# Patient Record
Sex: FEMALE | Race: WHITE | Employment: UNEMPLOYED | URBAN - METROPOLITAN AREA
[De-identification: names, ages, dates, MRNs, and addresses within clinical notes are randomized per-mention and may not be internally consistent; named-entity substitution may affect disease eponyms.]

---

## 2022-07-15 ENCOUNTER — TELEPHONE (OUTPATIENT)
Dept: FAMILY MEDICINE CLINIC | Age: 47
End: 2022-07-15

## 2022-07-15 NOTE — TELEPHONE ENCOUNTER
called on behalf of wife. She cant keep any food down and is so dehydrated. The  is taking her to [facility] for obs. She does have her appt with Dr Marguerite Munguia on the 20th.

## 2022-07-20 ENCOUNTER — OFFICE VISIT (OUTPATIENT)
Dept: FAMILY MEDICINE CLINIC | Age: 47
End: 2022-07-20
Payer: MEDICAID

## 2022-07-20 VITALS
DIASTOLIC BLOOD PRESSURE: 80 MMHG | HEART RATE: 94 BPM | OXYGEN SATURATION: 96 % | BODY MASS INDEX: 16.86 KG/M2 | SYSTOLIC BLOOD PRESSURE: 122 MMHG | HEIGHT: 65 IN | TEMPERATURE: 96.5 F | WEIGHT: 101.2 LBS

## 2022-07-20 DIAGNOSIS — D64.89 ANEMIA DUE TO OTHER CAUSE, NOT CLASSIFIED: ICD-10-CM

## 2022-07-20 DIAGNOSIS — F17.200 SMOKER: ICD-10-CM

## 2022-07-20 DIAGNOSIS — K02.9 PAIN DUE TO DENTAL CARIES: ICD-10-CM

## 2022-07-20 DIAGNOSIS — F17.210 CIGARETTE NICOTINE DEPENDENCE WITHOUT COMPLICATION: ICD-10-CM

## 2022-07-20 DIAGNOSIS — R53.83 FATIGUE, UNSPECIFIED TYPE: ICD-10-CM

## 2022-07-20 DIAGNOSIS — G62.9 NEUROPATHY: ICD-10-CM

## 2022-07-20 DIAGNOSIS — R56.9 SEIZURE (HCC): ICD-10-CM

## 2022-07-20 DIAGNOSIS — Z87.898 HISTORY OF ALCOHOL USE DISORDER: ICD-10-CM

## 2022-07-20 DIAGNOSIS — M62.84 SARCOPENIA: ICD-10-CM

## 2022-07-20 DIAGNOSIS — K86.0 ALCOHOL-INDUCED CHRONIC PANCREATITIS (HCC): Primary | ICD-10-CM

## 2022-07-20 DIAGNOSIS — Z11.59 NEED FOR HEPATITIS C SCREENING TEST: ICD-10-CM

## 2022-07-20 PROBLEM — K85.20 ACUTE ALCOHOLIC PANCREATITIS: Status: ACTIVE | Noted: 2019-02-23

## 2022-07-20 PROBLEM — R73.9 HYPERGLYCEMIA: Status: ACTIVE | Noted: 2022-07-20

## 2022-07-20 PROCEDURE — 99204 OFFICE O/P NEW MOD 45 MIN: CPT | Performed by: FAMILY MEDICINE

## 2022-07-20 PROCEDURE — G8419 CALC BMI OUT NRM PARAM NOF/U: HCPCS | Performed by: FAMILY MEDICINE

## 2022-07-20 PROCEDURE — G8427 DOCREV CUR MEDS BY ELIG CLIN: HCPCS | Performed by: FAMILY MEDICINE

## 2022-07-20 PROCEDURE — 36415 COLL VENOUS BLD VENIPUNCTURE: CPT | Performed by: FAMILY MEDICINE

## 2022-07-20 PROCEDURE — 4004F PT TOBACCO SCREEN RCVD TLK: CPT | Performed by: FAMILY MEDICINE

## 2022-07-20 RX ORDER — PANCRELIPASE 4200; 24600; 14200 [USP'U]/1; [USP'U]/1; [USP'U]/1
1 CAPSULE, DELAYED RELEASE ORAL
Qty: 90 CAPSULE | Refills: 2 | Status: SHIPPED | OUTPATIENT
Start: 2022-07-20 | End: 2022-10-28 | Stop reason: ALTCHOICE

## 2022-07-20 RX ORDER — TRAMADOL HYDROCHLORIDE 50 MG/1
50 TABLET ORAL EVERY 8 HOURS PRN
Qty: 30 TABLET | Refills: 0 | Status: SHIPPED | OUTPATIENT
Start: 2022-07-20 | End: 2022-08-30 | Stop reason: SDUPTHER

## 2022-07-20 RX ORDER — TRAMADOL HYDROCHLORIDE 50 MG/1
50 TABLET ORAL EVERY 6 HOURS PRN
COMMUNITY
End: 2022-07-20 | Stop reason: SDUPTHER

## 2022-07-20 RX ORDER — AMOXICILLIN 500 MG/1
500 CAPSULE ORAL 3 TIMES DAILY
Qty: 30 CAPSULE | Refills: 0 | Status: SHIPPED | OUTPATIENT
Start: 2022-07-20 | End: 2022-07-30

## 2022-07-20 RX ORDER — TRAMADOL HYDROCHLORIDE 50 MG/1
50 TABLET ORAL EVERY 8 HOURS PRN
COMMUNITY
Start: 2022-07-18 | End: 2022-07-20

## 2022-07-20 RX ORDER — PANCRELIPASE 4200; 24600; 14200 [USP'U]/1; [USP'U]/1; [USP'U]/1
1 CAPSULE, DELAYED RELEASE ORAL
Qty: 90 CAPSULE | Refills: 2 | Status: SHIPPED | OUTPATIENT
Start: 2022-07-20 | End: 2022-07-20 | Stop reason: SDUPTHER

## 2022-07-20 ASSESSMENT — PATIENT HEALTH QUESTIONNAIRE - PHQ9
SUM OF ALL RESPONSES TO PHQ9 QUESTIONS 1 & 2: 1
SUM OF ALL RESPONSES TO PHQ QUESTIONS 1-9: 1
2. FEELING DOWN, DEPRESSED OR HOPELESS: 0
SUM OF ALL RESPONSES TO PHQ QUESTIONS 1-9: 1
1. LITTLE INTEREST OR PLEASURE IN DOING THINGS: 1
SUM OF ALL RESPONSES TO PHQ QUESTIONS 1-9: 1
SUM OF ALL RESPONSES TO PHQ QUESTIONS 1-9: 1

## 2022-07-20 ASSESSMENT — ENCOUNTER SYMPTOMS
SHORTNESS OF BREATH: 0
DIARRHEA: 0
NAUSEA: 1
VOMITING: 1
COUGH: 0
BLOOD IN STOOL: 0
CONSTIPATION: 1
ABDOMINAL PAIN: 1
ANAL BLEEDING: 0
BACK PAIN: 1
ABDOMINAL DISTENTION: 1

## 2022-07-20 NOTE — ASSESSMENT & PLAN NOTE
She has sarcopenia and I encouraged her to experiment with different types of protein hoping that she can tolerate 1 better than another. I encouraged her to try legumes and eggs.

## 2022-07-20 NOTE — ASSESSMENT & PLAN NOTE
Or full Neuropathy could be from vitamin B-12 deficiency or could be from heavy alcohol use in the past.  I will order labs to look for possible treatable causes.

## 2022-07-20 NOTE — ASSESSMENT & PLAN NOTE
Encourage smoking cessation but I understand that right now she is dealing with things that are higher priority for her.

## 2022-07-20 NOTE — PROGRESS NOTES
7/20/22    Reyes Sherron  1975    SUBJECTIVE    HPI - David Angeles is a 48 y.o. female who presents today for evaluation of:  Chief Complaint   Patient presents with    New Patient     Acute pancreatitis : 2 yr pancreatitis. Recently in [place] was told she had a cyst. ([address]). She will be seeing a gastroenterologist at [facility]. Was hospitalized with a lot of pancreatitis pain a week ago due to pain and vomiting. Tramadol odes hlep her pain. She starts w/ apap but occ takes tramadol. She thinks 20 pills would last quite some time. Her most recent episode of pancreatitis occurred when she ate a hamburger and also went to a dairy place. She states that she can tolerate eggs. Dental : She also has swelling and pain in her right jaw. Her teeth are in horrible condition. History of seizures: She had some seizures in the past.  She took a seizure medication for a while but then forgot about it and stopped taking the seizure medicine. She has not had seizures recently. Feet : She reports numbness and burning in her feet and ankles up to the lower part of her calf. Review of Systems   Constitutional:  Positive for fatigue. Negative for fever. Respiratory:  Negative for cough and shortness of breath. Cardiovascular:  Negative for chest pain and leg swelling. Gastrointestinal:  Positive for abdominal distention, abdominal pain, constipation, nausea and vomiting. Negative for anal bleeding, blood in stool and diarrhea. Musculoskeletal:  Positive for back pain. Negative for arthralgias, joint swelling and neck pain. Neurological:  Positive for weakness. Negative for tremors, syncope and headaches.        No Known Allergies     OBJECTIVE    /80 (Site: Left Upper Arm, Position: Sitting, Cuff Size: Medium Adult)   Pulse 94   Temp (!) 96.5 °F (35.8 °C) (Infrared)   Ht 5' 5\" (1.651 m)   Wt 101 lb 3.2 oz (45.9 kg)   SpO2 96%   BMI 16.84 kg/m²     Physical Exam Constitutional:       General: Not in acute distress. Appearance: Normal appearance. Not ill-appearing. Eyes:      General: No scleral icterus. ENT: She has some swelling of her right mandible area and redness externally. Her teeth appear to be in very poor condition with multiple cavities. Cardiovascular:      Rate and Rhythm: Normal rate and regular rhythm. Heart sounds: No murmur heard. No friction rub. No gallop. Pulmonary:      Effort: Pulmonary effort is normal. No respiratory distress. Breath sounds: No wheezing, rhonchi or rales. Abdominal:      Palpations: Abdomen is soft. There is no mass. Tenderness: mild epigastric tenderness. Musculoskeletal:     Moves all extremities normally. Quite sarcopenic. Skin:     General: Skin is warm. Coloration: Skin is not jaundiced. Neurological:      Mental Status: Patient is alert. Psychiatric:         Behavior: Behavior normal.         Thought Content: Thought content normal.         Judgment: Judgment normal.    Reviewed:  PDMP shows recent tramadol rx for just 9 pills. ASSESSMENT/PLAN:    1. Pancreatitis, Alcohol-induced chronic pancreatitis [facility])  Assessment & Plan:   Her biggest problem is chronic pancreatitis. I will prescribe Pancreaze that she can take 3 times a day. She will be following up with her gastroenterology doctor with [facility]. I will prescribe a limited number of tramadol to help with her pain. I did check PDMP and It Shows 9 Tramadol tablets recently dispensed. Orders:  -     lipase-protease-amylase (PANCREAZE) 4200-86769 units CPEP delayed release capsule; Take 1 capsule by mouth in the morning and 1 capsule at noon and 1 capsule in the evening. Take with meals. , Disp-90 capsule, R-2Normal  -     traMADol (ULTRAM) 50 MG tablet; Take 1 tablet by mouth every 8 hours as needed for Pain for up to 15 days. , Disp-30 tablet, R-0Normal  -     Lipase  2.  Smoker  Assessment & Plan:   Encourage smoking cessation but I understand that right now she is dealing with things that are higher priority for her. 3. History of alcohol use disorder  Assessment & Plan:   She has stopped drinking alcohol completely and managed to do that successfully. 4. Seizure ([address]  Assessment & Plan:   Stable. 5. Cigarette nicotine dependence without complication  6. Anemia due to other cause, not classified  Assessment & Plan: We will order labs to evaluate for possible types of anemia. Orders:  -     CBC with Auto Differential  -     Iron and TIBC  -     Vitamin B12 & Folate  -     Comprehensive Metabolic Panel  7. Sarcopenia  Assessment & Plan:   She has sarcopenia and I encouraged her to experiment with different types of protein hoping that she can tolerate 1 better than another. I encouraged her to try legumes and eggs. 8. Neuropathy  Assessment & Plan:  Or full Neuropathy could be from vitamin B-12 deficiency or could be from heavy alcohol use in the past.  I will order labs to look for possible treatable causes. Orders:  -     TSH with Reflex to FT4  9. Fatigue, unspecified type  Assessment & Plan:   I will check labs for possible causes of fatigue. Orders:  -     Hepatitis B Surface Antigen  10. Need for hepatitis C screening test  -     Hepatitis C Antibody  11. Pain due to dental caries  Assessment & Plan:   I will prescribe amoxicillin to help with controlling dental infection. She is working on getting to a dentist.  Orders:  -     amoxicillin (AMOXIL) 500 MG capsule; Take 1 capsule by mouth in the morning and 1 capsule at noon and 1 capsule before bedtime. Do all this for 10 days. , Disp-30 capsule, R-0Normal    It is safe to take acetaminophen up to a total dose of 3000 mg spread through the day. Be sure to include acetaminophen in all products since acetaminophen can be in cold preparations and in some opioid pain medications.      Counseling provided for:    Cannabis - Heavy cannabis use will lead to a significant decline in your IQ. Recommended smoking cessation. Return in about 4 weeks (around 8/17/2022) for Juan Amaya.    Winston Amezcua MD

## 2022-07-20 NOTE — ASSESSMENT & PLAN NOTE
I will prescribe amoxicillin to help with controlling dental infection.   She is working on getting to a dentist.

## 2022-07-25 ENCOUNTER — NURSE ONLY (OUTPATIENT)
Dept: FAMILY MEDICINE CLINIC | Age: 47
End: 2022-07-25

## 2022-07-25 DIAGNOSIS — Z11.59 NEED FOR HEPATITIS C SCREENING TEST: Primary | ICD-10-CM

## 2022-07-25 LAB
BASOPHILS ABSOLUTE: 0.1 K/UL (ref 0–0.2)
BASOPHILS RELATIVE PERCENT: 0.8 %
EOSINOPHILS ABSOLUTE: 0.3 K/UL (ref 0–0.6)
EOSINOPHILS RELATIVE PERCENT: 4.6 %
HCT VFR BLD CALC: 39 % (ref 36–48)
HEMOGLOBIN: 12.7 G/DL (ref 12–16)
LYMPHOCYTES ABSOLUTE: 2.2 K/UL (ref 1–5.1)
LYMPHOCYTES RELATIVE PERCENT: 30.2 %
MCH RBC QN AUTO: 32.4 PG (ref 26–34)
MCHC RBC AUTO-ENTMCNC: 32.7 G/DL (ref 31–36)
MCV RBC AUTO: 98.9 FL (ref 80–100)
MONOCYTES ABSOLUTE: 0.3 K/UL (ref 0–1.3)
MONOCYTES RELATIVE PERCENT: 4 %
NEUTROPHILS ABSOLUTE: 4.4 K/UL (ref 1.7–7.7)
NEUTROPHILS RELATIVE PERCENT: 60.4 %
PDW BLD-RTO: 16.4 % (ref 12.4–15.4)
PLATELET # BLD: 462 K/UL (ref 135–450)
PMV BLD AUTO: 8.9 FL (ref 5–10.5)
RBC # BLD: 3.94 M/UL (ref 4–5.2)
WBC # BLD: 7.3 K/UL (ref 4–11)

## 2022-07-26 LAB
A/G RATIO: 1.3 (ref 1.1–2.2)
ALBUMIN SERPL-MCNC: 3.5 G/DL (ref 3.4–5)
ALP BLD-CCNC: 101 U/L (ref 40–129)
ALT SERPL-CCNC: 14 U/L (ref 10–40)
ANION GAP SERPL CALCULATED.3IONS-SCNC: 16 MMOL/L (ref 3–16)
AST SERPL-CCNC: 26 U/L (ref 15–37)
BILIRUB SERPL-MCNC: <0.2 MG/DL (ref 0–1)
BUN BLDV-MCNC: 7 MG/DL (ref 7–20)
CALCIUM SERPL-MCNC: 10.2 MG/DL (ref 8.3–10.6)
CHLORIDE BLD-SCNC: 99 MMOL/L (ref 99–110)
CO2: 23 MMOL/L (ref 21–32)
CREAT SERPL-MCNC: <0.5 MG/DL (ref 0.6–1.1)
FOLATE: >20 NG/ML (ref 4.78–24.2)
GFR AFRICAN AMERICAN: >60
GFR NON-AFRICAN AMERICAN: >60
GLUCOSE BLD-MCNC: 225 MG/DL (ref 70–99)
HEPATITIS B SURFACE ANTIGEN INTERPRETATION: NORMAL
HEPATITIS C ANTIBODY INTERPRETATION: NORMAL
IRON SATURATION: 28 % (ref 15–50)
IRON: 81 UG/DL (ref 37–145)
LIPASE: 127 U/L (ref 13–60)
POTASSIUM SERPL-SCNC: 4.5 MMOL/L (ref 3.5–5.1)
SODIUM BLD-SCNC: 138 MMOL/L (ref 136–145)
TOTAL IRON BINDING CAPACITY: 291 UG/DL (ref 260–445)
TOTAL PROTEIN: 6.1 G/DL (ref 6.4–8.2)
TSH REFLEX FT4: 0.51 UIU/ML (ref 0.27–4.2)
VITAMIN B-12: 402 PG/ML (ref 211–911)

## 2022-07-28 DIAGNOSIS — K86.0 ALCOHOL-INDUCED CHRONIC PANCREATITIS (HCC): Primary | ICD-10-CM

## 2022-08-05 ENCOUNTER — OFFICE (OUTPATIENT)
Dept: URBAN - METROPOLITAN AREA CLINIC 13 | Facility: CLINIC | Age: 47
End: 2022-08-05
Payer: MEDICAID

## 2022-08-05 VITALS
OXYGEN SATURATION: 99 % | HEIGHT: 65 IN | DIASTOLIC BLOOD PRESSURE: 60 MMHG | HEART RATE: 96 BPM | WEIGHT: 97.2 LBS | SYSTOLIC BLOOD PRESSURE: 100 MMHG

## 2022-08-05 DIAGNOSIS — R63.4 ABNORMAL WEIGHT LOSS: ICD-10-CM

## 2022-08-05 DIAGNOSIS — R93.3 ABNORMAL FINDINGS ON DIAGNOSTIC IMAGING OF OTHER PARTS OF DI: ICD-10-CM

## 2022-08-05 DIAGNOSIS — K86.9 DISEASE OF PANCREAS, UNSPECIFIED: ICD-10-CM

## 2022-08-05 PROCEDURE — 99215 OFFICE O/P EST HI 40 MIN: CPT | Performed by: INTERNAL MEDICINE

## 2022-08-26 LAB
CBC, PLATELET CT  AND  DIFF: ABS BASOPHIL: 0.1 K/UL
CBC, PLATELET CT  AND  DIFF: ABS EOSINOPHIL: 0.1 K/UL
CBC, PLATELET CT  AND  DIFF: ABS IMMATURE GRANS: 0.1 K/UL
CBC, PLATELET CT  AND  DIFF: ABS LYMPHOCYTE: 3.3 K/UL
CBC, PLATELET CT  AND  DIFF: ABS MONOCYTE: 1 K/UL
CBC, PLATELET CT  AND  DIFF: ABS NEUTROPHIL: 5.3 K/UL
CBC, PLATELET CT  AND  DIFF: BASOPHIL: 0.6 %
CBC, PLATELET CT  AND  DIFF: DIFFERENTIAL: (no result)
CBC, PLATELET CT  AND  DIFF: EOSINOPHIL: 1.2 %
CBC, PLATELET CT  AND  DIFF: HEMATOCRIT: 43.4 %
CBC, PLATELET CT  AND  DIFF: HEMOGLOBIN: 14.9 G/DL
CBC, PLATELET CT  AND  DIFF: IMMATURE GRANULOCYTES: 0.6 %
CBC, PLATELET CT  AND  DIFF: LYMPHOCYTE: 33.5 %
CBC, PLATELET CT  AND  DIFF: MCH: 32.7 PG
CBC, PLATELET CT  AND  DIFF: MCHC: 34.3 G/DL
CBC, PLATELET CT  AND  DIFF: MCV: 95.4 FL
CBC, PLATELET CT  AND  DIFF: MONOCYTE: 9.8 %
CBC, PLATELET CT  AND  DIFF: MPV: 10.8 FL
CBC, PLATELET CT  AND  DIFF: NEUTROPHIL: 54.3 %
CBC, PLATELET CT  AND  DIFF: NRBCS: 0 /100 WBC
CBC, PLATELET CT  AND  DIFF: PLATELET COUNT: 230 K/UL
CBC, PLATELET CT  AND  DIFF: RBC: 4.55 M/UL
CBC, PLATELET CT  AND  DIFF: RDW: 14.2 %
CBC, PLATELET CT  AND  DIFF: WBC COUNT: 9.7 K/UL
COMPREHENSIVE METABOLIC PANEL: A/G RATIO: 1.3 RATIO
COMPREHENSIVE METABOLIC PANEL: ALBUMIN: 4.3 G/DL
COMPREHENSIVE METABOLIC PANEL: ALK PHOSPHATASE: 129 U/L
COMPREHENSIVE METABOLIC PANEL: ALT: 9 U/L
COMPREHENSIVE METABOLIC PANEL: AST: 22 U/L
COMPREHENSIVE METABOLIC PANEL: BILIRUBIN,TOTAL: 0.4 MG/DL
COMPREHENSIVE METABOLIC PANEL: BLOOD UREA NITROGEN: 7 MG/DL
COMPREHENSIVE METABOLIC PANEL: BUN/CREAT RATIO: 18
COMPREHENSIVE METABOLIC PANEL: CALCIUM: 10.1 MG/DL
COMPREHENSIVE METABOLIC PANEL: CHLORIDE: 99 MEQ/L
COMPREHENSIVE METABOLIC PANEL: CO2: 25 MEQ/L
COMPREHENSIVE METABOLIC PANEL: CREATININE: 0.4 MG/DL — LOW
COMPREHENSIVE METABOLIC PANEL: FASTING STATUS: (no result)
COMPREHENSIVE METABOLIC PANEL: GLOBULIN: 3.2 G/DL
COMPREHENSIVE METABOLIC PANEL: GLOMERULAR FILTRATION RATE (GFR): 122 MLS/MIN/1.73M2
COMPREHENSIVE METABOLIC PANEL: GLUCOSE,RANDOM: 77 MG/DL
COMPREHENSIVE METABOLIC PANEL: POTASSIUM: 3.2 MEQ/L — LOW
COMPREHENSIVE METABOLIC PANEL: SODIUM: 135 MEQ/L
COMPREHENSIVE METABOLIC PANEL: TOTAL PROTEIN: 7.5 G/DL
LIPASE: 278 U/L — HIGH

## 2022-08-30 ENCOUNTER — OFFICE VISIT (OUTPATIENT)
Dept: FAMILY MEDICINE CLINIC | Age: 47
End: 2022-08-30
Payer: MEDICAID

## 2022-08-30 VITALS
DIASTOLIC BLOOD PRESSURE: 80 MMHG | SYSTOLIC BLOOD PRESSURE: 122 MMHG | TEMPERATURE: 97.1 F | BODY MASS INDEX: 15.49 KG/M2 | HEART RATE: 106 BPM | HEIGHT: 65 IN | WEIGHT: 93 LBS | OXYGEN SATURATION: 97 %

## 2022-08-30 DIAGNOSIS — Z78.0 MENOPAUSE: ICD-10-CM

## 2022-08-30 DIAGNOSIS — Z01.419 VISIT FOR GYNECOLOGIC EXAMINATION: ICD-10-CM

## 2022-08-30 DIAGNOSIS — K86.0 ALCOHOL-INDUCED CHRONIC PANCREATITIS (HCC): ICD-10-CM

## 2022-08-30 DIAGNOSIS — Z12.12 SCREENING FOR COLORECTAL CANCER: ICD-10-CM

## 2022-08-30 DIAGNOSIS — Z00.00 ENCOUNTER FOR WELL ADULT EXAM WITHOUT ABNORMAL FINDINGS: Primary | ICD-10-CM

## 2022-08-30 DIAGNOSIS — Z12.11 SCREENING FOR COLORECTAL CANCER: ICD-10-CM

## 2022-08-30 DIAGNOSIS — M62.84 SARCOPENIA: ICD-10-CM

## 2022-08-30 DIAGNOSIS — G62.9 NEUROPATHY: ICD-10-CM

## 2022-08-30 PROCEDURE — 99396 PREV VISIT EST AGE 40-64: CPT | Performed by: FAMILY MEDICINE

## 2022-08-30 PROCEDURE — G8419 CALC BMI OUT NRM PARAM NOF/U: HCPCS | Performed by: FAMILY MEDICINE

## 2022-08-30 PROCEDURE — 4004F PT TOBACCO SCREEN RCVD TLK: CPT | Performed by: FAMILY MEDICINE

## 2022-08-30 PROCEDURE — 99213 OFFICE O/P EST LOW 20 MIN: CPT | Performed by: FAMILY MEDICINE

## 2022-08-30 PROCEDURE — G8427 DOCREV CUR MEDS BY ELIG CLIN: HCPCS | Performed by: FAMILY MEDICINE

## 2022-08-30 RX ORDER — TRAMADOL HYDROCHLORIDE 50 MG/1
50 TABLET ORAL EVERY 8 HOURS PRN
Qty: 30 TABLET | Refills: 0 | Status: SHIPPED | OUTPATIENT
Start: 2022-09-15 | End: 2022-09-30

## 2022-08-30 RX ORDER — HYDROCODONE BITARTRATE AND ACETAMINOPHEN 5; 325 MG/1; MG/1
1 TABLET ORAL EVERY 6 HOURS PRN
COMMUNITY

## 2022-08-30 RX ORDER — GABAPENTIN 300 MG/1
300 CAPSULE ORAL 2 TIMES DAILY
Qty: 60 CAPSULE | Refills: 1 | Status: SHIPPED | OUTPATIENT
Start: 2022-08-30 | End: 2022-10-29

## 2022-08-30 RX ORDER — SODIUM PICOSULFATE, MAGNESIUM OXIDE, AND ANHYDROUS CITRIC ACID 10; 3.5; 12 MG/160ML; G/160ML; G/160ML
LIQUID ORAL
COMMUNITY
Start: 2022-08-08

## 2022-08-30 RX ORDER — IBUPROFEN 600 MG/1
600 TABLET ORAL EVERY 6 HOURS PRN
COMMUNITY

## 2022-08-30 ASSESSMENT — ENCOUNTER SYMPTOMS
NAUSEA: 0
ANAL BLEEDING: 0
COUGH: 0
CONSTIPATION: 0
VOMITING: 0
EYE PAIN: 0
TROUBLE SWALLOWING: 0
SHORTNESS OF BREATH: 0
BLOOD IN STOOL: 0
ABDOMINAL PAIN: 1
APNEA: 0
DIARRHEA: 0

## 2022-08-30 NOTE — PROGRESS NOTES
8/30/22    Johanna Bebe  1975  48 y.o. female     Adult Annual Preventive Visit  Chief Complaint   Patient presents with    Follow-up   E/M : Pancreatitis : she manages minor flares of pancreatitis with liwquid diet. She still has about 15 tramadol left. Tramadol is working pretty well. She has lost some weight. She is working with a GI doctor. She is holding the tramadol now while using Norco. Burning pain in feet and ankles. Activity habits: She is pretty active. She is active when working. Eating habits: Not eating now due to pancratitis. Normally eats reasonably well. Sleep habits: good. Social support: Good    Mentation:   No or minimal trouble with memory and Learning new things    Review of Systems   Constitutional:  Negative for appetite change, fatigue, fever and unexpected weight change. HENT:  Negative for nosebleeds and trouble swallowing. Eyes:  Negative for pain and visual disturbance. Respiratory:  Negative for apnea, cough and shortness of breath. Cardiovascular:  Negative for chest pain and leg swelling. Gastrointestinal:  Positive for abdominal pain. Negative for anal bleeding, blood in stool, constipation, diarrhea, nausea and vomiting. Endocrine: Negative for cold intolerance, heat intolerance and polyuria. Genitourinary:  Negative for difficulty urinating and hematuria. Musculoskeletal:  Positive for arthralgias (tingly in feet and ankles. ). Negative for gait problem. Skin:  Negative for rash and wound. Allergic/Immunologic: Negative for environmental allergies, food allergies and immunocompromised state. Neurological:  Negative for speech difficulty, light-headedness and headaches. Hematological:  Negative for adenopathy. Does not bruise/bleed easily. Psychiatric/Behavioral:  Negative for dysphoric mood. The patient is nervous/anxious. No menses x 5 yrs.      No Known Allergies     Current Outpatient Medications   Medication Sig Dispense Refill    HYDROcodone-acetaminophen (NORCO) 5-325 MG per tablet Take 1 tablet by mouth every 6 hours as needed for Pain. ibuprofen (ADVIL;MOTRIN) 600 MG tablet Take 600 mg by mouth every 6 hours as needed for Pain      [START ON 9/15/2022] traMADol (ULTRAM) 50 MG tablet Take 1 tablet by mouth every 8 hours as needed for Pain for up to 15 days. 30 tablet 0    gabapentin (NEURONTIN) 300 MG capsule Take 1 capsule by mouth 2 times daily for 60 days. Intended supply: 30 days 60 capsule 1    CLENPIQ 10-3.5-12 MG-GM -GM/160ML SOLN solution DRINK 1ST DOSE AT 5PM DAY PRE-PROCEDURE. DRINK 2ND DOSE 6HRS PRE-PROCEDURE. NOTHING BY MOUTH 4HR PRE-PROCEDURE      lipase-protease-amylase (PANCREAZE) 4200-92255 units CPEP delayed release capsule Take 1 capsule by mouth in the morning and 1 capsule at noon and 1 capsule in the evening. Take with meals. (Patient not taking: Reported on 8/30/2022) 90 capsule 2     No current facility-administered medications for this visit. OBJECTIVE    /80 (Site: Right Upper Arm, Position: Sitting, Cuff Size: Medium Adult)   Pulse (!) 106   Temp 97.1 °F (36.2 °C) (Infrared)   Ht 5' 5\" (1.651 m)   Wt 93 lb (42.2 kg)   SpO2 97%   BMI 15.48 kg/m²     Physical Exam   Constitutional:       General: Not in acute distress. Appearance: Normal appearance. Not ill-appearing, toxic-appearing or diaphoretic. HENT:      Head: Normocephalic. Right Ear: Tympanic membrane, ear canal and external ear normal.      Left Ear: Tympanic membrane, ear canal and external ear normal.      Nose: No rhinorrhea. Mouth/Throat:      Mouth: Mucous membranes are moist.      Pharynx: Oropharynx is clear. No oropharyngeal exudate or posterior oropharyngeal erythema. Eyes:      General: No scleral icterus. Right eye: No discharge. Left eye: No discharge. Conjunctiva/sclera: Conjunctivae normal.   Neck:      Thyroid: No thyroid mass, thyromegaly or thyroid tenderness. Cardiovascular:      Rate and Rhythm: Normal rate and regular rhythm. Pulses:           Posterior tibial pulses are 2+ on the right side and 2+ on the left side. NL [place] DP pulses. Heart sounds: Normal heart sounds. No murmur heard. No friction rub. No gallop. Pulmonary:      Effort: Pulmonary effort is normal. No respiratory distress. Breath sounds: Normal breath sounds. No stridor. No wheezing, rhonchi or rales. Abdominal:      General: There is no distension. Palpations: Abdomen is soft. Tenderness: There is mild abdominal tenderness. There is no guarding. Musculoskeletal:         General: No deformity. Cervical back: No rigidity. Right lower leg: No edema. Left lower leg: No edema. Lymphadenopathy:      Cervical: No cervical adenopathy. Right upper body: No supraclavicular or axillary adenopathy. Left upper body: No supraclavicular or axillary adenopathy. Lower Body: No right inguinal adenopathy. No left inguinal adenopathy. Skin:     General: Skin is warm. Coloration: Skin is not jaundiced. Greatly thickened grt toenails bilat. Neurological:      Mental Status: She is alert. Deep Tendon Reflexes:      Reflex Scores:       Patellar reflexes are 2+ on the right side and 2+ on the left side. Psychiatric:         Attention and Perception: Attention and perception normal.         Mood and Affect: Mood normal.         Speech: Speech normal.         Behavior: Behavior normal.         Thought Content: Thought content normal.    PDMP shows last tramadol #30 from me on 7/21/22 and #20 hydrocodone/apap from dentist 8/29/2022. ASSESSMENT AND PLAN    1. Encounter for well adult exam without abnormal findings  -     Negin Dumont MD, Asiya Grajeda  2. Visit for gynecologic examination  -     Negin Dumont MD, OB-GYNAsiya  3. Screening for colorectal cancer  4.  Menopause  Assessment & Plan:   She is definitely at increased risk of osteoporosis being thin female who smokes. Recommended smoking cessation. Discussed getting enough calcium in foods and getting some sunlight for vitamin D and considering supplemental magnesium. Most importantly I recommended weightbearing exercise and smoking cessation. Orders:  -     DEXA BONE DENSITY AXIAL SKELETON; Future  5. Sarcopenia  Assessment & Plan:   Encouraged strength exercises. 6. Pancreatitis, Alcohol-induced chronic pancreatitis [facility])  Assessment & Plan:   She is able to manage minor flares of pancreatitis. Tramadol is helpful for the pain and I will put out a prescription for tramadol that she can get filled in about mid September although she may not even need it then. Orders:  -     traMADol (ULTRAM) 50 MG tablet; Take 1 tablet by mouth every 8 hours as needed for Pain for up to 15 days. , Disp-30 tablet, R-0Normal  7. Neuropathy  Assessment & Plan:   I will do a trial of gabapentin for nerve pain management. Her neuropathy may be from history of alcohol use. Orders:  -     gabapentin (NEURONTIN) 300 MG capsule; Take 1 capsule by mouth 2 times daily for 60 days. Intended supply: 30 days, Disp-60 capsule, R-1Normal        Counseling provided for:  Healthy eating - Avoid sugar and other refined carbohydrates. Protein can help to get muscle. Try different kinds of protein and find those that do not bother the pancreas. Do weight bearing exercise. Social support - Keep socially involved. This will help with keeping your brain working well (avoiding dementia) as you get older and also help you to be happier. , Sleep hygeine - Get enough rest. Things that help are making sure the room is dark and cool, avoiding screen use for 1-2 hours before bedtime, having an unwinding routine.  , and Mentally activity - Keep trying to learn new things, reading, following sports/fashion/whatever you like, and other mental things to keep your brain working well and avoid dementia. Recommended smoking cessation. Remember to be thankful for the good things in life. Return in about 7 weeks (around 10/20/2022) for pancratitis.      Spenser Parsons MD

## 2022-08-30 NOTE — PATIENT INSTRUCTIONS
Advance Directives: Care Instructions  Overview  An advance directive is a legal way to state your wishes at the end of your life. It tells your family and your doctor what to do if you can't say what youwant. There are two main types of advance directives. You can change them any timeyour wishes change. Living will. This form tells your family and your doctor your wishes about life support and other treatment. The form is also called a declaration. Medical power of . This form lets you name a person to make treatment decisions for you when you can't speak for yourself. This person is called a health care agent (health care proxy, health care surrogate). The form is also called a durable power of  for health care. If you do not have an advance directive, decisions about your medical care maybe made by a family member, or by a doctor or a  who doesn't know you. It may help to think of an advance directive as a gift to the people who carefor you. If you have one, they won't have to make tough decisions by themselves. Follow-up care is a key part of your treatment and safety. Be sure to make and go to all appointments, and call your doctor if you are having problems. It's also a good idea to know your test results and keep alist of the medicines you take. What should you include in an advance directive? Many states have a unique advance directive form. (It may ask you to address specific issues.) Or you might use a universal form that's approved by manystates. If your form doesn't tell you what to address, it may be hard to know what to include in your advance directive. Use the questions below to help you getstarted. Who do you want to make decisions about your medical care if you are not able to? What life-support measures do you want if you have a serious illness that gets worse over time or can't be cured? What are you most afraid of that might happen?  (Maybe you're afraid of having pain, losing your independence, or being kept alive by machines.)  Where would you prefer to die? (Your home? A hospital? A nursing home?)  Do you want to donate your organs when you die? Do you want certain Tenriism practices performed before you die? When should you call for help? Be sure to contact your doctor if you have any questions. Where can you learn more? Go to https://[email or web address].Bosse [email or web address] org and sign in to your BATS account. Enter R264 in the Joldit.com box to learn more about \"Advance Directives: Care Instructions. \"     If you do not have an account, please click on the \"Sign Up Now\" link. Current as of: October 18, 2021               Content Version: 13.3  © 2006-2022 Healthwise, [employer]. Care instructions adapted under license by [facility] ([facility]). If you have questions about a medical condition or this instruction, always ask your healthcare professional. [place] [address] any warranty or liability for your use of this information. Quitting Tobacco: Care Instructions  Overview     People who use tobacco crave the nicotine in tobacco. Giving it up is much harder than simply changing a habit. Your body has to stop craving the nicotine. It is hard to quit, but you can do it. There are many tools thatpeople use to quit. You may find that combining tools works best for you. There are several steps to quitting. Your doctor will help you set up the plan that best meets your needs. You may want to attend a tobacco-cessation program to help you quit. When you choose a program, look for one that has proven success. Ask your doctor for ideas. You will greatly increase your chances of success if you take medicine as well as get counseling or join a cessationprogram.  Some of the changes you feel when you first quit tobacco are uncomfortable. Your body will miss the nicotine at first, and you may feel short-tempered and grumpy.  You may have trouble sleeping or concentrating. Medicine can help you deal with these symptoms. You may struggle with changing your habits. And theurge to use tobacco may continue for a time. This may be a lot to deal with, but keep at it. You will feel better. Follow-up care is a key part of your treatment and safety. Be sure to make and go to all appointments, and call your doctor if you are having problems. It's also a good idea to know your test results and keep alist of the medicines you take. How can you care for yourself at home? Get support. You have a better chance of quitting if you have help and support. Ask your family, friends, and coworkers for support. Join a support group, such as Nicotine Anonymous, for people who are trying to quit using tobacco.  Consider signing up for a tobacco cessation program, such as the American Lung Association's Freedom from Smoking program.  Get text messaging support. Go to the website at www.smokefree. gov to sign up for the [facility] program.  After you quit, do not use tobacco again, not even once. Get rid of all tobacco products and anything that reminds you of tobacco, like ashtrays, spit cups, and lighters. If you smoke, clean your house and your clothes to get rid of the smell. Learn how to live without tobacco. Think about ways you can avoid those things that make you reach for tobacco.  Avoid situations that put you at greatest risk. For some people, it's hard to have a drink with friends or a coffee break with coworkers without using tobacco.  Change your daily routine. Take a different route to work, or eat a meal in a different place. Try to cut down on stress. Calm yourself or release tension by doing an activity you enjoy, such as reading a book, taking a hot bath, or gardening. Learn about treatments that can help you quit. Talk to your doctor or pharmacist about nicotine replacement therapy.  Nicotine replacement products help you slowly reduce the amount of nicotine you need. They can help you deal with cravings and withdrawal symptoms. These products include nicotine patches, gum, lozenges, nasal spray, and inhalers. Most are available without a prescription. Ask your doctor about varenicline (Chantix) or bupropion SR. These prescription medicines can help reduce withdrawal symptoms. They don't contain nicotine. Eat a healthy diet, and get regular exercise. Having healthy habits will help your body move past its craving for nicotine. Be prepared to keep trying. Most people aren't successful the first few times they try to quit. Don't give up if you use tobacco again. Make a list of things you learned, and think about when you want to try again. Where can you learn more? Go to https://Taggs.[employer]it [employer]. org and sign in to your [employer] account. Enter E273 in the Doutor [employer]enda box to learn more about \"Quitting Tobacco: Care Instructions. \"     If you do not have an account, please click on the \"Sign Up Now\" link. Current as of: November 8, 2021               Content Version: 13.3  © 0736-1666 [employer]. Care instructions adapted under license by [employer]. If you have questions about a medical condition or this instruction, always ask your healthcare professional. [place] [address] any warranty or liability for your use of this information. Well Visit, Ages 25 to 48: Care Instructions  Overview     Well visits can help you stay healthy. Your doctor has checked your overall health and may have suggested ways to take good care of yourself. Your doctor also may have recommended tests. At home, you can help prevent illness withhealthy eating, regular exercise, and other steps. Follow-up care is a key part of your treatment and safety. Be sure to make and go to all appointments, and call your doctor if you are having problems.  It's also a good idea to know your test results and keep alist of the medicines you take. How can you care for yourself at home? Get screening tests that you and your doctor decide on. Screening helps find diseases before any symptoms appear. Eat healthy foods. Choose fruits, vegetables, whole grains, protein, and low-fat dairy foods. Limit fat, especially saturated fat. Reduce salt in your diet. Limit alcohol. If you are a man, have no more than 2 drinks a day or 14 drinks a week. If you are a woman, have no more than 1 drink a day or 7 drinks a week. Get at least 30 minutes of physical activity on most days of the week. Walking is a good choice. You also may want to do other activities, such as running, swimming, cycling, or playing tennis or team sports. Discuss any changes in your exercise program with your doctor. Reach and stay at a healthy weight. This will lower your risk for many problems, such as obesity, diabetes, heart disease, and high blood pressure. Do not smoke or allow others to smoke around you. If you need help quitting, talk to your doctor about stop-smoking programs and medicines. These can increase your chances of quitting for good. Care for your mental health. It is easy to get weighed down by worry and stress. Learn strategies to manage stress, like deep breathing and mindfulness, and stay connected with your family and community. If you find you often feel sad or hopeless, talk with your doctor. Treatment can help. Talk to your doctor about whether you have any risk factors for sexually transmitted infections (STIs). You can help prevent STIs if you wait to have sex with a new partner (or partners) until you've each been tested for STIs. It also helps if you use condoms (male or female condoms) and if you limit your sex partners to one person who only has sex with you. Vaccines are available for some STIs, such as HPV. Use birth control if it's important to you to prevent pregnancy.  Talk with your doctor about the choices available and what might be

## 2022-08-30 NOTE — ASSESSMENT & PLAN NOTE
She is able to manage minor flares of pancreatitis. Tramadol is helpful for the pain and I will put out a prescription for tramadol that she can get filled in about mid September although she may not even need it then.

## 2022-08-30 NOTE — ASSESSMENT & PLAN NOTE
I will do a trial of gabapentin for nerve pain management. Her neuropathy may be from history of alcohol use.

## 2022-08-30 NOTE — ASSESSMENT & PLAN NOTE
She is definitely at increased risk of osteoporosis being thin female who smokes. Recommended smoking cessation. Discussed getting enough calcium in foods and getting some sunlight for vitamin D and considering supplemental magnesium. Most importantly I recommended weightbearing exercise and smoking cessation.

## 2022-09-09 ENCOUNTER — OFFICE (OUTPATIENT)
Dept: URBAN - METROPOLITAN AREA PATHOLOGY 1 | Facility: PATHOLOGY | Age: 47
End: 2022-09-09
Payer: MEDICAID

## 2022-09-09 ENCOUNTER — AMBULATORY SURGICAL CENTER (OUTPATIENT)
Dept: URBAN - METROPOLITAN AREA SURGERY 4 | Facility: SURGERY | Age: 47
End: 2022-09-09
Payer: MEDICAID

## 2022-09-09 VITALS
SYSTOLIC BLOOD PRESSURE: 127 MMHG | DIASTOLIC BLOOD PRESSURE: 75 MMHG | SYSTOLIC BLOOD PRESSURE: 125 MMHG | HEART RATE: 71 BPM | DIASTOLIC BLOOD PRESSURE: 92 MMHG | SYSTOLIC BLOOD PRESSURE: 148 MMHG | RESPIRATION RATE: 16 BRPM | RESPIRATION RATE: 15 BRPM | SYSTOLIC BLOOD PRESSURE: 110 MMHG | RESPIRATION RATE: 15 BRPM | SYSTOLIC BLOOD PRESSURE: 118 MMHG | DIASTOLIC BLOOD PRESSURE: 90 MMHG | HEART RATE: 72 BPM | DIASTOLIC BLOOD PRESSURE: 90 MMHG | TEMPERATURE: 97.6 F | SYSTOLIC BLOOD PRESSURE: 132 MMHG | DIASTOLIC BLOOD PRESSURE: 81 MMHG | SYSTOLIC BLOOD PRESSURE: 125 MMHG | RESPIRATION RATE: 17 BRPM | HEART RATE: 70 BPM | WEIGHT: 93 LBS | DIASTOLIC BLOOD PRESSURE: 72 MMHG | OXYGEN SATURATION: 100 % | HEART RATE: 65 BPM | DIASTOLIC BLOOD PRESSURE: 75 MMHG | DIASTOLIC BLOOD PRESSURE: 92 MMHG | RESPIRATION RATE: 18 BRPM | DIASTOLIC BLOOD PRESSURE: 86 MMHG | HEART RATE: 72 BPM | TEMPERATURE: 97.6 F | RESPIRATION RATE: 12 BRPM | RESPIRATION RATE: 23 BRPM | HEIGHT: 65 IN | HEART RATE: 66 BPM | OXYGEN SATURATION: 99 % | SYSTOLIC BLOOD PRESSURE: 127 MMHG | HEART RATE: 65 BPM | DIASTOLIC BLOOD PRESSURE: 80 MMHG | SYSTOLIC BLOOD PRESSURE: 118 MMHG | SYSTOLIC BLOOD PRESSURE: 133 MMHG | DIASTOLIC BLOOD PRESSURE: 80 MMHG | DIASTOLIC BLOOD PRESSURE: 81 MMHG | RESPIRATION RATE: 17 BRPM | OXYGEN SATURATION: 100 % | DIASTOLIC BLOOD PRESSURE: 94 MMHG | HEART RATE: 69 BPM | OXYGEN SATURATION: 97 % | RESPIRATION RATE: 16 BRPM | HEART RATE: 71 BPM | SYSTOLIC BLOOD PRESSURE: 137 MMHG | HEIGHT: 65 IN | RESPIRATION RATE: 12 BRPM | DIASTOLIC BLOOD PRESSURE: 72 MMHG | OXYGEN SATURATION: 97 % | HEART RATE: 66 BPM | HEART RATE: 70 BPM | SYSTOLIC BLOOD PRESSURE: 129 MMHG | WEIGHT: 93 LBS | SYSTOLIC BLOOD PRESSURE: 129 MMHG | SYSTOLIC BLOOD PRESSURE: 132 MMHG | HEART RATE: 69 BPM | RESPIRATION RATE: 23 BRPM | HEART RATE: 81 BPM | DIASTOLIC BLOOD PRESSURE: 94 MMHG | SYSTOLIC BLOOD PRESSURE: 137 MMHG | SYSTOLIC BLOOD PRESSURE: 148 MMHG | DIASTOLIC BLOOD PRESSURE: 86 MMHG | SYSTOLIC BLOOD PRESSURE: 110 MMHG | SYSTOLIC BLOOD PRESSURE: 133 MMHG | OXYGEN SATURATION: 99 % | HEART RATE: 81 BPM | RESPIRATION RATE: 18 BRPM

## 2022-09-09 DIAGNOSIS — D12.5 BENIGN NEOPLASM OF SIGMOID COLON: ICD-10-CM

## 2022-09-09 DIAGNOSIS — R93.3 ABNORMAL FINDINGS ON DIAGNOSTIC IMAGING OF OTHER PARTS OF DI: ICD-10-CM

## 2022-09-09 DIAGNOSIS — R63.4 ABNORMAL WEIGHT LOSS: ICD-10-CM

## 2022-09-09 PROCEDURE — 88305 TISSUE EXAM BY PATHOLOGIST: CPT | Performed by: PATHOLOGY

## 2022-09-09 PROCEDURE — 43235 EGD DIAGNOSTIC BRUSH WASH: CPT | Performed by: INTERNAL MEDICINE

## 2022-09-09 PROCEDURE — 45385 COLONOSCOPY W/LESION REMOVAL: CPT | Performed by: INTERNAL MEDICINE

## 2022-09-16 LAB
PDF: PDF REPORT: (no result)
PDF: PDF REPORT: (no result)

## 2022-09-17 ENCOUNTER — INPATIENT HOSPITAL (OUTPATIENT)
Dept: URBAN - METROPOLITAN AREA HOSPITAL 56 | Facility: HOSPITAL | Age: 47
End: 2022-09-17
Payer: MEDICAID

## 2022-09-17 DIAGNOSIS — F10.11 ALCOHOL ABUSE, IN REMISSION: ICD-10-CM

## 2022-09-17 DIAGNOSIS — K86.0 ALCOHOL-INDUCED CHRONIC PANCREATITIS: ICD-10-CM

## 2022-09-17 DIAGNOSIS — E44.0 MODERATE PROTEIN-CALORIE MALNUTRITION: ICD-10-CM

## 2022-09-17 PROCEDURE — 99254 IP/OBS CNSLTJ NEW/EST MOD 60: CPT | Performed by: NURSE PRACTITIONER

## 2022-09-18 ENCOUNTER — INPATIENT HOSPITAL (OUTPATIENT)
Dept: URBAN - METROPOLITAN AREA HOSPITAL 56 | Facility: HOSPITAL | Age: 47
End: 2022-09-18
Payer: MEDICAID

## 2022-09-18 DIAGNOSIS — F10.11 ALCOHOL ABUSE, IN REMISSION: ICD-10-CM

## 2022-09-18 DIAGNOSIS — K86.0 ALCOHOL-INDUCED CHRONIC PANCREATITIS: ICD-10-CM

## 2022-09-18 DIAGNOSIS — E44.0 MODERATE PROTEIN-CALORIE MALNUTRITION: ICD-10-CM

## 2022-09-18 PROCEDURE — 99232 SBSQ HOSP IP/OBS MODERATE 35: CPT | Performed by: NURSE PRACTITIONER

## 2022-09-21 ENCOUNTER — TELEPHONE (OUTPATIENT)
Dept: FAMILY MEDICINE CLINIC | Age: 47
End: 2022-09-21

## 2022-09-21 DIAGNOSIS — K86.0 ALCOHOL-INDUCED CHRONIC PANCREATITIS (HCC): Primary | ICD-10-CM

## 2022-09-21 RX ORDER — PROMETHAZINE HYDROCHLORIDE 25 MG/1
25 SUPPOSITORY RECTAL EVERY 6 HOURS PRN
Qty: 6 SUPPOSITORY | Refills: 1 | Status: SHIPPED | OUTPATIENT
Start: 2022-09-21 | End: 2022-09-27 | Stop reason: SDUPTHER

## 2022-09-21 NOTE — TELEPHONE ENCOUNTER
Went to hospital w/ pancreatitis and got better w/ Dilaudid. Got better and went home Monday. 5pm Monday vomited. Tuesday got better during the day. It was thought to possibly be Dilaudid withdrawal.   In hospital she was on liquid diet Sat, Sun morning and ate normal food Sun evening and Mon morning. She was on Dilaudid at that time. Now she is clammy. Has trouble keeping down water. no fever. No blood in vomit. Pain is 8/10. Yest had liquid diet. Yesterday evening she had small amount solid food. Hx of seizures but has an anti-seizure rx. I sent Phenergan suppository to [facility] and explained use. I rec'd go to ER if hematemesis or fever or worse.  It is reasonable to try to get through it at home first.

## 2022-09-26 ENCOUNTER — TELEPHONE (OUTPATIENT)
Dept: FAMILY MEDICINE CLINIC | Age: 47
End: 2022-09-26

## 2022-09-26 NOTE — TELEPHONE ENCOUNTER
Pt called stating that she still has pancreatitis after  prescribed some medication to help. Pt states that the medication has not really been helping. Pt states that she is throwing up green stuff. Pt wants to know if there is anything else she can do.

## 2022-09-26 NOTE — TELEPHONE ENCOUNTER
In the evening of 9/24/2022 she called me stating that she was still having trouble keeping food and water down. She did sounds better than she had 2 to 3 days prior. She and I did not think she needed to go to the ER. I did call a prescription for ondansetron to her pharmacy.

## 2022-09-27 ENCOUNTER — OFFICE VISIT (OUTPATIENT)
Dept: FAMILY MEDICINE CLINIC | Age: 47
End: 2022-09-27
Payer: MEDICAID

## 2022-09-27 VITALS
BODY MASS INDEX: 15.76 KG/M2 | SYSTOLIC BLOOD PRESSURE: 112 MMHG | WEIGHT: 94.6 LBS | HEIGHT: 65 IN | TEMPERATURE: 96.5 F | OXYGEN SATURATION: 97 % | HEART RATE: 71 BPM | DIASTOLIC BLOOD PRESSURE: 70 MMHG

## 2022-09-27 DIAGNOSIS — F17.200 SMOKER: ICD-10-CM

## 2022-09-27 DIAGNOSIS — M62.84 SARCOPENIA: ICD-10-CM

## 2022-09-27 DIAGNOSIS — K85.90 ACUTE RECURRENT PANCREATITIS: ICD-10-CM

## 2022-09-27 DIAGNOSIS — R56.9 SEIZURE (HCC): ICD-10-CM

## 2022-09-27 DIAGNOSIS — K86.0 ALCOHOL-INDUCED CHRONIC PANCREATITIS (HCC): Primary | ICD-10-CM

## 2022-09-27 PROCEDURE — 99215 OFFICE O/P EST HI 40 MIN: CPT | Performed by: FAMILY MEDICINE

## 2022-09-27 PROCEDURE — G8427 DOCREV CUR MEDS BY ELIG CLIN: HCPCS | Performed by: FAMILY MEDICINE

## 2022-09-27 PROCEDURE — 4004F PT TOBACCO SCREEN RCVD TLK: CPT | Performed by: FAMILY MEDICINE

## 2022-09-27 PROCEDURE — G8419 CALC BMI OUT NRM PARAM NOF/U: HCPCS | Performed by: FAMILY MEDICINE

## 2022-09-27 RX ORDER — HYDROCODONE BITARTRATE AND ACETAMINOPHEN 5; 325 MG/1; MG/1
1 TABLET ORAL EVERY 6 HOURS PRN
Qty: 28 TABLET | Refills: 0 | Status: SHIPPED | OUTPATIENT
Start: 2022-09-27 | End: 2022-10-04

## 2022-09-27 RX ORDER — PROMETHAZINE HYDROCHLORIDE 25 MG/1
25 SUPPOSITORY RECTAL EVERY 6 HOURS PRN
Qty: 6 SUPPOSITORY | Refills: 1 | Status: SHIPPED | OUTPATIENT
Start: 2022-09-27 | End: 2022-10-04

## 2022-09-27 RX ORDER — PANTOPRAZOLE SODIUM 40 MG/1
40 TABLET, DELAYED RELEASE ORAL DAILY
Qty: 30 TABLET | Refills: 0 | Status: SHIPPED | OUTPATIENT
Start: 2022-09-27 | End: 2022-10-28 | Stop reason: SDUPTHER

## 2022-09-27 RX ORDER — LEVETIRACETAM 250 MG/1
TABLET ORAL
COMMUNITY
Start: 2022-09-19

## 2022-09-27 RX ORDER — ONDANSETRON 8 MG/1
8 TABLET, ORALLY DISINTEGRATING ORAL 3 TIMES DAILY PRN
Qty: 30 TABLET | Refills: 1 | Status: SHIPPED | OUTPATIENT
Start: 2022-09-27 | End: 2022-10-28 | Stop reason: SDUPTHER

## 2022-09-27 RX ORDER — PROMETHAZINE HYDROCHLORIDE 25 MG/1
25 TABLET ORAL 4 TIMES DAILY PRN
Qty: 20 TABLET | Refills: 1 | Status: SHIPPED | OUTPATIENT
Start: 2022-09-27 | End: 2022-10-04

## 2022-09-27 RX ORDER — ONDANSETRON 8 MG/1
1 TABLET, ORALLY DISINTEGRATING ORAL 3 TIMES DAILY PRN
COMMUNITY
Start: 2022-09-24 | End: 2022-09-27 | Stop reason: SDUPTHER

## 2022-09-27 ASSESSMENT — ENCOUNTER SYMPTOMS
SORE THROAT: 0
WHEEZING: 0
DIARRHEA: 0
COUGH: 0
ANAL BLEEDING: 0
CONSTIPATION: 1
CHOKING: 0
SHORTNESS OF BREATH: 1
VOMITING: 1
ABDOMINAL PAIN: 1
BLOOD IN STOOL: 0
NAUSEA: 1
TROUBLE SWALLOWING: 1
ABDOMINAL DISTENTION: 0

## 2022-09-27 NOTE — ASSESSMENT & PLAN NOTE
She has been having recurrent pancreatitis. In the past she drank alcohol heavily but that was over a year ago. It is unclear whether the current bouts of pancreatitis are a residual effect of the former drinking or a problem related to the pancreaticobiliary ductal system. I told her that her assignment is to make her next appointment with her gastroenterologist so that she can review the MRCP from July 2022 and decide what next steps should be done. In the meantime I will prescribe hydrocodone for pain relief when the tramadol does not help adequately. I will put out a new prescription for Phenergan suppositories that can be used when she is vomiting too much to take a pill. I put out a refill prescription for Zofran to help with vomiting. Also I put out a prescription for Phenergan tablets that she can use when she feels that she can take a tablet. Encouraged that she eats small amounts of foods that contain protein such as fish or peanut butter.

## 2022-09-27 NOTE — ASSESSMENT & PLAN NOTE
Encouraged smoking cessation not only because smoking is harmful to the health in general but also because smoking reduces the appetite. She needs appetite in order to help with consumption of enough protein to treat her sarcopenia.

## 2022-09-27 NOTE — PROGRESS NOTES
9/27/22    Michelle Amen  1975    TIP    HPI - Henry Appiah is a 48 y.o. female who presents today for evaluation of:  Chief Complaint   Patient presents with    Other     Pancreatitis w/green emesis      Follow-up     Received fluids and pain medications told her to still come to this appointment       Was in hospital from Friday to Monday with pancreatitis. When went home she attributed the bad feeling to Dilaudid. Last Wed I called in phenergan supps. They helped a little. Saturday I called in Zofran and she felt better Sunday. Monday woke up horrible again. Monday (9/27/22) she went to [facility]. She formerly drank heavily and in the past yr only drank one glass champaign. Her gastroenterologist is Dr Sherlyn Manjarrez w/ [facility], [facility]. She has not yet set up a f/u. She has their phone #. Had a protein shake last night that she kept down. Does not tolerate milk. Recently got some veggies like mashed potatoes and some soup doup. Eating causes pain the next day. She tolerates fish. The tramadol does not always help her pain. She uses it sparingly and still has about 10 tramadol pills left. In 2018 she had a seizure attributed to alcohol withdrawal but  is confused because she had been drinking the night before. Review of Systems   Constitutional:  Positive for chills. Negative for fever (99 to 100). HENT:  Positive for trouble swallowing. Negative for sore throat. Respiratory:  Positive for shortness of breath. Negative for cough, choking and wheezing. Cardiovascular:  Negative for chest pain. Gastrointestinal:  Positive for abdominal pain, constipation, nausea and vomiting. Negative for abdominal distention, anal bleeding, blood in stool and diarrhea. Genitourinary:  Negative for dysuria, hematuria, vaginal bleeding, vaginal discharge and vaginal pain. Psychiatric/Behavioral:  Negative for dysphoric mood.         No Known Allergies     OBJECTIVE    /70 (Site: Left Upper Arm, Position: Sitting, Cuff Size: Medium Adult)   Pulse 71   Temp (!) 96.5 °F (35.8 °C) (Infrared)   Ht 5' 5\" (1.651 m)   Wt 94 lb 9.6 oz (42.9 kg)   SpO2 97%   BMI 15.74 kg/m²     Physical Exam   Constitutional:       General: Not in acute distress. Appearance: Normal appearance. Not ill-appearing. Eyes:      General: No scleral icterus. Cardiovascular:      Rate and Rhythm: Normal rate and regular rhythm. Heart sounds: No murmur heard. No friction rub. No gallop. Pulmonary:      Effort: Pulmonary effort is normal. No respiratory distress. Breath sounds: No wheezing, rhonchi or rales. Abdominal:      Palpations: Abdomen is soft. There is no mass. Tenderness: There is no abdominal tenderness. Musculoskeletal:     Moves all extremities normally. Skin:     General: Skin is warm. Coloration: Skin is not jaundiced. Neurological:      Mental Status: Patient is alert. Psychiatric:         Behavior: Behavior normal.         Thought Content: Thought content normal.         Judgment: Judgment normal.    Reviewed:  9/17/22 MRI of head shows slight progression of white matter disease. No active demyelinization. 7/16/22 MR-MRCP shows some findings suggesting f/u imaging     Hepatic panel shows normal alb and protein (9/26/22)  CBC (9/26/22) shows slightly high WBC.  9/26/22 BMP shows low Na+ of 132. PDMP shows #30 tramadol dispensed 8/30/22 & hydrocodone 5/325 #20 on 8/29/22. ASSESSMENT/PLAN:    1. Pancreatitis, Alcohol-induced chronic pancreatitis ([facility] [address]  2. Acute recurrent pancreatitis  Assessment & Plan:   She has been having recurrent pancreatitis. In the past she drank alcohol heavily but that was over a year ago. It is unclear whether the current bouts of pancreatitis are a residual effect of the former drinking or a problem related to the pancreaticobiliary ductal system.   I told her that her assignment is to make her next appointment with her from some other cause. I will refer her to a neurologist.  There is some evidence of white matter disease on her recent CT and MRI. Orders:  -     External Referral To Neurology  5. Smoker  Assessment & Plan:   Encouraged smoking cessation not only because smoking is harmful to the health in general but also because smoking reduces the appetite. She needs appetite in order to help with consumption of enough protein to treat her sarcopenia. Try to reduce smoking to improve appetite. 48 minutes were spent this calendar day in pre-charting and chart review; obtaining history, performing exam, medical decision making, and counseling patient/caregiver/family, completing orders and documentation, communicating with other health professional*, independently interpreting results and communicating results to patient/caregiver/family*, and care coordination*. *Items marked with asterisk not reported or billed separately. (Time for ECG interpretation or other separately billed interpretation not included in my total minutes.)    Return in about 3 weeks (around 10/18/2022) for pancreatitis.    Kiana Hernandez MD

## 2022-09-27 NOTE — ASSESSMENT & PLAN NOTE
Encouraged consumption of protein. Protein shakes are acceptable but I encouraged other sources of protein such as fish and peanuts as well. I also suggested legume beans. I recommended that they not be in the form of chili but be in a form of plain beans.

## 2022-09-27 NOTE — ASSESSMENT & PLAN NOTE
She has a history of seizures. It is unclear whether her seizures are from alcohol withdrawal or from some other cause. I will refer her to a neurologist.  There is some evidence of white matter disease on her recent CT and MRI.

## 2022-10-03 ENCOUNTER — TELEPHONE (OUTPATIENT)
Dept: FAMILY MEDICINE CLINIC | Age: 47
End: 2022-10-03

## 2022-10-03 NOTE — TELEPHONE ENCOUNTER
She called 10/1/22 with report that after a few d of being better she had N/V again. She is concerned that the pancreas will have permanent damage if not treated and asked I I could admit her to [facility] to bypass ER. I explained that I cannot direct admit to hospital and that she should return to ER and report the story of the past week or so and inquire if the failure of outpatient treatment would support admission. I am not sure whether or not persistent problems would lead to permanent pancrweas damamge but I do not think they would quickly.
Date:12/21/2018

## 2022-10-06 ENCOUNTER — OFFICE (OUTPATIENT)
Dept: URBAN - METROPOLITAN AREA CLINIC 13 | Facility: CLINIC | Age: 47
End: 2022-10-06
Payer: MEDICAID

## 2022-10-06 VITALS
HEART RATE: 81 BPM | DIASTOLIC BLOOD PRESSURE: 64 MMHG | WEIGHT: 94 LBS | SYSTOLIC BLOOD PRESSURE: 118 MMHG | HEIGHT: 65 IN

## 2022-10-06 DIAGNOSIS — K86.9 DISEASE OF PANCREAS, UNSPECIFIED: ICD-10-CM

## 2022-10-06 PROCEDURE — 99213 OFFICE O/P EST LOW 20 MIN: CPT | Mod: SA | Performed by: NURSE PRACTITIONER

## 2022-10-13 ENCOUNTER — TELEPHONE (OUTPATIENT)
Dept: FAMILY MEDICINE CLINIC | Age: 47
End: 2022-10-13

## 2022-10-13 NOTE — TELEPHONE ENCOUNTER
Pt requesting a refill for Tramadol and Pt is also having difficulties with a bowel movement and wondering if there is something that could be prescribed.  Pt has her next appt on the 18th of October

## 2022-10-27 ENCOUNTER — AMBULATORY SURGICAL CENTER (OUTPATIENT)
Dept: URBAN - METROPOLITAN AREA SURGERY 7 | Facility: SURGERY | Age: 47
End: 2022-10-27
Payer: MEDICAID

## 2022-10-27 VITALS
HEART RATE: 78 BPM | SYSTOLIC BLOOD PRESSURE: 109 MMHG | SYSTOLIC BLOOD PRESSURE: 91 MMHG | SYSTOLIC BLOOD PRESSURE: 91 MMHG | HEART RATE: 91 BPM | DIASTOLIC BLOOD PRESSURE: 74 MMHG | DIASTOLIC BLOOD PRESSURE: 71 MMHG | TEMPERATURE: 97.3 F | HEART RATE: 85 BPM | OXYGEN SATURATION: 98 % | DIASTOLIC BLOOD PRESSURE: 58 MMHG | HEIGHT: 66 IN | DIASTOLIC BLOOD PRESSURE: 58 MMHG | DIASTOLIC BLOOD PRESSURE: 62 MMHG | RESPIRATION RATE: 15 BRPM | SYSTOLIC BLOOD PRESSURE: 98 MMHG | RESPIRATION RATE: 16 BRPM | HEART RATE: 82 BPM | SYSTOLIC BLOOD PRESSURE: 81 MMHG | RESPIRATION RATE: 18 BRPM | SYSTOLIC BLOOD PRESSURE: 82 MMHG | HEART RATE: 74 BPM | HEART RATE: 70 BPM | DIASTOLIC BLOOD PRESSURE: 59 MMHG | HEIGHT: 66 IN | SYSTOLIC BLOOD PRESSURE: 86 MMHG | WEIGHT: 94 LBS | HEART RATE: 82 BPM | RESPIRATION RATE: 14 BRPM | HEART RATE: 99 BPM | RESPIRATION RATE: 15 BRPM | SYSTOLIC BLOOD PRESSURE: 84 MMHG | WEIGHT: 94 LBS | HEART RATE: 76 BPM | HEART RATE: 91 BPM | SYSTOLIC BLOOD PRESSURE: 104 MMHG | DIASTOLIC BLOOD PRESSURE: 52 MMHG | SYSTOLIC BLOOD PRESSURE: 104 MMHG | SYSTOLIC BLOOD PRESSURE: 99 MMHG | SYSTOLIC BLOOD PRESSURE: 84 MMHG | DIASTOLIC BLOOD PRESSURE: 68 MMHG | SYSTOLIC BLOOD PRESSURE: 88 MMHG | RESPIRATION RATE: 11 BRPM | RESPIRATION RATE: 17 BRPM | DIASTOLIC BLOOD PRESSURE: 73 MMHG | DIASTOLIC BLOOD PRESSURE: 66 MMHG | RESPIRATION RATE: 18 BRPM | HEART RATE: 70 BPM | SYSTOLIC BLOOD PRESSURE: 82 MMHG | RESPIRATION RATE: 11 BRPM | DIASTOLIC BLOOD PRESSURE: 71 MMHG | DIASTOLIC BLOOD PRESSURE: 62 MMHG | RESPIRATION RATE: 17 BRPM | HEART RATE: 78 BPM | HEART RATE: 99 BPM | DIASTOLIC BLOOD PRESSURE: 66 MMHG | SYSTOLIC BLOOD PRESSURE: 86 MMHG | OXYGEN SATURATION: 100 % | HEART RATE: 98 BPM | DIASTOLIC BLOOD PRESSURE: 68 MMHG | RESPIRATION RATE: 16 BRPM | DIASTOLIC BLOOD PRESSURE: 52 MMHG | HEART RATE: 95 BPM | SYSTOLIC BLOOD PRESSURE: 81 MMHG | TEMPERATURE: 97.3 F | HEART RATE: 95 BPM | DIASTOLIC BLOOD PRESSURE: 59 MMHG | SYSTOLIC BLOOD PRESSURE: 109 MMHG | SYSTOLIC BLOOD PRESSURE: 88 MMHG | SYSTOLIC BLOOD PRESSURE: 99 MMHG | SYSTOLIC BLOOD PRESSURE: 98 MMHG | DIASTOLIC BLOOD PRESSURE: 74 MMHG | HEART RATE: 98 BPM | HEART RATE: 76 BPM | RESPIRATION RATE: 14 BRPM | HEART RATE: 74 BPM | OXYGEN SATURATION: 100 % | HEART RATE: 85 BPM | OXYGEN SATURATION: 98 % | DIASTOLIC BLOOD PRESSURE: 73 MMHG

## 2022-10-27 DIAGNOSIS — K86.9 DISEASE OF PANCREAS, UNSPECIFIED: ICD-10-CM

## 2022-10-27 PROCEDURE — 43259 EGD US EXAM DUODENUM/JEJUNUM: CPT | Performed by: INTERNAL MEDICINE

## 2022-10-28 ENCOUNTER — OFFICE VISIT (OUTPATIENT)
Dept: FAMILY MEDICINE CLINIC | Age: 47
End: 2022-10-28
Payer: MEDICAID

## 2022-10-28 VITALS
WEIGHT: 92.6 LBS | TEMPERATURE: 96.7 F | HEART RATE: 55 BPM | BODY MASS INDEX: 15.43 KG/M2 | OXYGEN SATURATION: 93 % | HEIGHT: 65 IN | DIASTOLIC BLOOD PRESSURE: 70 MMHG | SYSTOLIC BLOOD PRESSURE: 102 MMHG

## 2022-10-28 DIAGNOSIS — K86.0 ALCOHOL-INDUCED CHRONIC PANCREATITIS (HCC): Primary | ICD-10-CM

## 2022-10-28 PROBLEM — K85.90 ACUTE RECURRENT PANCREATITIS: Status: RESOLVED | Noted: 2022-09-16 | Resolved: 2022-10-28

## 2022-10-28 PROCEDURE — G8427 DOCREV CUR MEDS BY ELIG CLIN: HCPCS | Performed by: FAMILY MEDICINE

## 2022-10-28 PROCEDURE — G8484 FLU IMMUNIZE NO ADMIN: HCPCS | Performed by: FAMILY MEDICINE

## 2022-10-28 PROCEDURE — 99213 OFFICE O/P EST LOW 20 MIN: CPT | Performed by: FAMILY MEDICINE

## 2022-10-28 PROCEDURE — 4004F PT TOBACCO SCREEN RCVD TLK: CPT | Performed by: FAMILY MEDICINE

## 2022-10-28 PROCEDURE — G8419 CALC BMI OUT NRM PARAM NOF/U: HCPCS | Performed by: FAMILY MEDICINE

## 2022-10-28 RX ORDER — PROMETHAZINE HYDROCHLORIDE 25 MG/1
25 TABLET ORAL EVERY 6 HOURS PRN
COMMUNITY
Start: 2022-10-28

## 2022-10-28 RX ORDER — PANCRELIPASE LIPASE, PANCRELIPASE PROTEASE, PANCRELIPASE AMYLASE 25000; 79000; 105000 [USP'U]/1; [USP'U]/1; [USP'U]/1
1 CAPSULE, DELAYED RELEASE ORAL 4 TIMES DAILY
COMMUNITY
Start: 2022-10-14

## 2022-10-28 RX ORDER — ONDANSETRON 8 MG/1
8 TABLET, ORALLY DISINTEGRATING ORAL 3 TIMES DAILY PRN
Qty: 30 TABLET | Refills: 1 | Status: SHIPPED | OUTPATIENT
Start: 2022-10-28

## 2022-10-28 RX ORDER — PANTOPRAZOLE SODIUM 40 MG/1
40 TABLET, DELAYED RELEASE ORAL DAILY
Qty: 90 TABLET | Refills: 1 | Status: SHIPPED | OUTPATIENT
Start: 2022-10-28

## 2022-10-28 RX ORDER — TRAMADOL HYDROCHLORIDE 50 MG/1
50 TABLET ORAL EVERY 6 HOURS PRN
Qty: 60 TABLET | Refills: 0 | Status: SHIPPED | OUTPATIENT
Start: 2022-10-28 | End: 2022-12-27

## 2022-10-28 ASSESSMENT — ENCOUNTER SYMPTOMS
ABDOMINAL PAIN: 0
DIARRHEA: 0
VOMITING: 0
NAUSEA: 0

## 2022-10-28 NOTE — PROGRESS NOTES
10/28/22    Breanna [facility]  1975    SUBJECTIVE    HPI - Giovanni Waite is a 48 y.o. female who presents today for evaluation of:  Chief Complaint   Patient presents with    Follow-up     Pancreatitis      Pancreatitis : Had Upper GI procedure yesterday and all OK. She got approved to get Wally Andrew. She feels established with a GI doctor. Review of Systems   Constitutional:  Negative for fatigue and fever. Gastrointestinal:  Negative for abdominal pain, diarrhea, nausea and vomiting. No Known Allergies     OBJECTIVE    /70 (Site: Left Upper Arm, Position: Sitting, Cuff Size: Medium Adult)   Pulse 55   Temp (!) 96.7 °F (35.9 °C) (Infrared)   Ht 5' 5\" (1.651 m)   Wt 92 lb 9.6 oz (42 kg)   SpO2 93%   BMI 15.41 kg/m²     Physical Exam   Constitutional:       General: Not in acute distress. Appearance: Normal appearance. Not ill-appearing. Eyes:      General: No scleral icterus. Cardiovascular:      Rate and Rhythm: Normal rate and regular rhythm. Heart sounds: No murmur heard. No friction rub. No gallop. Pulmonary:      Effort: Pulmonary effort is normal. No respiratory distress. Breath sounds: No wheezing, rhonchi or rales. Abdominal:      Palpations: Abdomen is soft. There is no mass. Tenderness: There is minimal abdominal tenderness. Musculoskeletal:     Moves all extremities normally. Skin:     General: Skin is warm. Coloration: Skin is not jaundiced. Neurological:      Mental Status: Patient is alert. Psychiatric:         Behavior: Behavior normal.         Thought Content: Thought content normal.         Judgment: Judgment normal.    Reviewed:  10/27/22 UA, CBC, lipase, CMP OK. PDMP shows last hydrocodone 5mg #28 on 9/27/22  . ASSESSMENT/PLAN:    1. Alcohol-induced chronic pancreatitis [facility])  Assessment & Plan: It is great that she is now getting the same Pap. I will refill pantoprazole.   I explained that there are numerous PPI medications all ending and is old that are available without a prescription. I will prescribe 60 tramadol tablets which she expects will last about 3 months. I will also put out a refill for Zofran. Orders:  -     ondansetron (ZOFRAN-ODT) 8 MG TBDP disintegrating tablet; Take 1 tablet by mouth 3 times daily as needed for Nausea, Disp-30 tablet, R-1Can reduce to smaller quantitiy if needed for insurance. Normal  -     pantoprazole (PROTONIX) 40 MG tablet; Take 1 tablet by mouth daily Take 20-30 minutes before a meal., Disp-90 tablet, R-1Normal  -     traMADol (ULTRAM) 50 MG tablet; Take 1 tablet by mouth every 6 hours as needed for Pain for up to 60 days. Intended supply: 60 days. Take lowest dose possible to manage pain, Disp-60 tablet, R-0Normal          Return in about 3 months (around 1/28/2023).    Esteban Lira MD

## 2022-10-28 NOTE — ASSESSMENT & PLAN NOTE
It is great that she is now getting the same Pap. I will refill pantoprazole. I explained that there are numerous PPI medications all ending and is old that are available without a prescription. I will prescribe 60 tramadol tablets which she expects will last about 3 months. I will also put out a refill for Zofran.

## 2022-11-04 ENCOUNTER — OFFICE (OUTPATIENT)
Dept: URBAN - METROPOLITAN AREA CLINIC 13 | Facility: CLINIC | Age: 47
End: 2022-11-04
Payer: MEDICAID

## 2022-11-04 VITALS
SYSTOLIC BLOOD PRESSURE: 110 MMHG | HEIGHT: 66 IN | HEART RATE: 76 BPM | OXYGEN SATURATION: 92 % | DIASTOLIC BLOOD PRESSURE: 70 MMHG

## 2022-11-04 DIAGNOSIS — K86.9 DISEASE OF PANCREAS, UNSPECIFIED: ICD-10-CM

## 2022-11-04 PROCEDURE — 99213 OFFICE O/P EST LOW 20 MIN: CPT | Mod: SA | Performed by: NURSE PRACTITIONER

## 2022-11-04 RX ORDER — PREGABALIN 150 MG/1
CAPSULE ORAL
Qty: 60 | Refills: 5 | Status: COMPLETED
Start: 2022-11-04 | End: 2024-07-23 | Stop reason: SDUPTHER

## 2023-02-06 ENCOUNTER — OFFICE VISIT (OUTPATIENT)
Dept: FAMILY MEDICINE CLINIC | Age: 48
End: 2023-02-06
Payer: MEDICAID

## 2023-02-06 VITALS
BODY MASS INDEX: 16.69 KG/M2 | DIASTOLIC BLOOD PRESSURE: 70 MMHG | OXYGEN SATURATION: 100 % | SYSTOLIC BLOOD PRESSURE: 110 MMHG | HEIGHT: 65 IN | HEART RATE: 94 BPM | WEIGHT: 100.2 LBS | TEMPERATURE: 97.6 F

## 2023-02-06 DIAGNOSIS — M62.84 SARCOPENIA: ICD-10-CM

## 2023-02-06 DIAGNOSIS — R56.9 SEIZURE (HCC): ICD-10-CM

## 2023-02-06 DIAGNOSIS — Z13.220 SCREENING FOR HYPERLIPIDEMIA: ICD-10-CM

## 2023-02-06 DIAGNOSIS — Z01.419 VISIT FOR GYNECOLOGIC EXAMINATION: ICD-10-CM

## 2023-02-06 DIAGNOSIS — K86.0 ALCOHOL-INDUCED CHRONIC PANCREATITIS (HCC): Primary | ICD-10-CM

## 2023-02-06 DIAGNOSIS — F17.210 CIGARETTE NICOTINE DEPENDENCE WITHOUT COMPLICATION: ICD-10-CM

## 2023-02-06 DIAGNOSIS — E55.9 VITAMIN D DEFICIENCY: ICD-10-CM

## 2023-02-06 DIAGNOSIS — G62.9 NEUROPATHY: ICD-10-CM

## 2023-02-06 DIAGNOSIS — D63.8 ANEMIA IN OTHER CHRONIC DISEASES CLASSIFIED ELSEWHERE: ICD-10-CM

## 2023-02-06 PROBLEM — F17.200 SMOKER: Status: RESOLVED | Noted: 2022-07-20 | Resolved: 2023-02-06

## 2023-02-06 PROBLEM — D64.9 ANEMIA, UNSPECIFIED: Status: ACTIVE | Noted: 2022-07-20

## 2023-02-06 PROCEDURE — G8427 DOCREV CUR MEDS BY ELIG CLIN: HCPCS | Performed by: FAMILY MEDICINE

## 2023-02-06 PROCEDURE — 36415 COLL VENOUS BLD VENIPUNCTURE: CPT | Performed by: FAMILY MEDICINE

## 2023-02-06 PROCEDURE — 4004F PT TOBACCO SCREEN RCVD TLK: CPT | Performed by: FAMILY MEDICINE

## 2023-02-06 PROCEDURE — G8419 CALC BMI OUT NRM PARAM NOF/U: HCPCS | Performed by: FAMILY MEDICINE

## 2023-02-06 PROCEDURE — 99214 OFFICE O/P EST MOD 30 MIN: CPT | Performed by: FAMILY MEDICINE

## 2023-02-06 PROCEDURE — G8484 FLU IMMUNIZE NO ADMIN: HCPCS | Performed by: FAMILY MEDICINE

## 2023-02-06 RX ORDER — PREGABALIN 150 MG/1
CAPSULE ORAL
COMMUNITY
Start: 2023-02-01

## 2023-02-06 RX ORDER — ONDANSETRON 8 MG/1
8 TABLET, ORALLY DISINTEGRATING ORAL 3 TIMES DAILY PRN
Qty: 30 TABLET | Refills: 2 | Status: SHIPPED | OUTPATIENT
Start: 2023-02-06

## 2023-02-06 RX ORDER — TRAMADOL HYDROCHLORIDE 50 MG/1
50 TABLET ORAL EVERY 6 HOURS PRN
Qty: 60 TABLET | Refills: 0 | Status: SHIPPED | OUTPATIENT
Start: 2023-02-06 | End: 2023-04-07

## 2023-02-06 RX ORDER — PROMETHAZINE HYDROCHLORIDE 25 MG/1
25 TABLET ORAL EVERY 6 HOURS PRN
Qty: 30 TABLET | Refills: 1 | Status: SHIPPED | OUTPATIENT
Start: 2023-02-06

## 2023-02-06 ASSESSMENT — ENCOUNTER SYMPTOMS
ABDOMINAL PAIN: 0
SHORTNESS OF BREATH: 0
COUGH: 0
CONSTIPATION: 1
NAUSEA: 0
VOMITING: 0
BLOOD IN STOOL: 0
WHEEZING: 0
DIARRHEA: 0
ANAL BLEEDING: 0

## 2023-02-06 ASSESSMENT — PATIENT HEALTH QUESTIONNAIRE - PHQ9
SUM OF ALL RESPONSES TO PHQ QUESTIONS 1-9: 0
SUM OF ALL RESPONSES TO PHQ QUESTIONS 1-9: 0
1. LITTLE INTEREST OR PLEASURE IN DOING THINGS: 0
SUM OF ALL RESPONSES TO PHQ QUESTIONS 1-9: 0
SUM OF ALL RESPONSES TO PHQ QUESTIONS 1-9: 0
SUM OF ALL RESPONSES TO PHQ9 QUESTIONS 1 & 2: 0
2. FEELING DOWN, DEPRESSED OR HOPELESS: 0

## 2023-02-06 NOTE — ASSESSMENT & PLAN NOTE
I will check B12. She will continue pregabalin as prescribed by another provider. She has a few gabapentin left that she can use if she feels that the pregabalin is not working well.

## 2023-02-06 NOTE — ASSESSMENT & PLAN NOTE
Her chronic pancreatitis is generally well controlled. She is learning to avoid the foods that tend to exacerbate it although she understands that this can be unpredictable.

## 2023-02-06 NOTE — PROGRESS NOTES
2/6/23    Lilian Monahan  1975    SUBJECTIVE    HPI - Fely Hardy is a 50 y.o. female who presents today for evaluation of:  Chief Complaint   Patient presents with    3 Month Follow-Up     Tramadol refill       Chronic pancreatitis: she has been on the road and had a couple episodes of pancreatitis that resoloved. She needs some more tramadol and ondansetron. She tends to get exacerbations with spicy food which she now avoids. Seizures: no recent seizures. Smoking : has reduced smoking a lot. Neuropathy : has a little neuropathy. Pregabalin instead of gabapentin. She was taking it only at night. Review of Systems   Respiratory:  Negative for cough, shortness of breath and wheezing. Gastrointestinal:  Positive for constipation. Negative for abdominal pain, anal bleeding, blood in stool, diarrhea, nausea and vomiting. Psychiatric/Behavioral:  The patient is nervous/anxious. No Known Allergies     OBJECTIVE    /70 (Site: Left Upper Arm, Position: Sitting, Cuff Size: Medium Adult)   Pulse 94   Temp 97.6 °F (36.4 °C) (Infrared)   Ht 5' 5\" (1.651 m)   Wt 100 lb 3.2 oz (45.5 kg)   SpO2 100%   BMI 16.67 kg/m²     Physical Exam   Constitutional:       General: Not in acute distress. Appearance: Normal appearance. Not ill-appearing. Eyes:      General: No scleral icterus. Cardiovascular:      Rate and Rhythm: Normal rate and regular rhythm. Heart sounds: No murmur heard. No friction rub. No gallop. Pulmonary:      Effort: Pulmonary effort is normal. No respiratory distress. Breath sounds: No wheezing, rhonchi or rales. Abdominal:      Palpations: Abdomen is soft. There is no mass. Tenderness: There is no abdominal tenderness. Musculoskeletal:     Moves all extremities normally. Skin:     General: Skin is warm. Coloration: Skin is not jaundiced. Neurological:      Mental Status: Patient is alert.    Psychiatric:         Behavior: Behavior normal. Thought Content: Thought content normal.         Judgment: Judgment normal.    Reviewed:  PDMP shows last rx of tramadol #60 on 10/28/22. ASSESSMENT/PLAN:    1. Alcohol-induced chronic pancreatitis [facility])  Assessment & Plan:   Her chronic pancreatitis is generally well controlled. She is learning to avoid the foods that tend to exacerbate it although she understands that this can be unpredictable. Orders:  -     Comprehensive Metabolic Panel  -     Lipase  -     ondansetron (ZOFRAN-ODT) 8 MG TBDP disintegrating tablet; Take 1 tablet by mouth 3 times daily as needed for Nausea, Disp-30 tablet, R-2Can reduce to smaller quantitiy if needed for insurance. Normal  -     traMADol (ULTRAM) 50 MG tablet; Take 1 tablet by mouth every 6 hours as needed for Pain for up to 60 days. Intended supply: 60 days. Take lowest dose possible to manage pain, Disp-60 tablet, R-0Normal  -     promethazine (PHENERGAN) 25 MG tablet; Take 1 tablet by mouth every 6 hours as needed for Nausea, Disp-30 tablet, R-1Normal  2. Anemia in other chronic diseases classified elsewhere  Assessment & Plan:   I will check a CBC. Orders:  -     CBC with Auto Differential  -     Vitamin B12 & Folate  3. Cigarette nicotine dependence without complication  Assessment & Plan:   Encouraged smoking cessation. 4. Sarcopenia  Assessment & Plan:   Checking CMP and magnesium and vitamin D levels. Orders:  -     Vitamin D 25 Hydroxy  -     Magnesium  5. Neuropathy  Assessment & Plan:   I will check B12. She will continue pregabalin as prescribed by another provider. She has a few gabapentin left that she can use if she feels that the pregabalin is not working well. Orders:  -     Vitamin B12 & Folate  6. Seizure [facility])  Assessment & Plan:   No recent seizures. 7. Visit for gynecologic examination  -     Donavan Mock MD, OB-GYN, [place]  8. Screening for hyperlipidemia  -     Lipid Panel      No follow-ups on file.    Austin Patiño, MD

## 2023-02-07 LAB
A/G RATIO: 1.5 (ref 1.1–2.2)
ALBUMIN SERPL-MCNC: 4 G/DL (ref 3.4–5)
ALP BLD-CCNC: 104 U/L (ref 40–129)
ALT SERPL-CCNC: 8 U/L (ref 10–40)
ANION GAP SERPL CALCULATED.3IONS-SCNC: 15 MMOL/L (ref 3–16)
AST SERPL-CCNC: 16 U/L (ref 15–37)
BILIRUB SERPL-MCNC: <0.2 MG/DL (ref 0–1)
BUN BLDV-MCNC: 6 MG/DL (ref 7–20)
CALCIUM SERPL-MCNC: 9.6 MG/DL (ref 8.3–10.6)
CHLORIDE BLD-SCNC: 102 MMOL/L (ref 99–110)
CHOLESTEROL, TOTAL: 162 MG/DL (ref 0–199)
CO2: 23 MMOL/L (ref 21–32)
CREAT SERPL-MCNC: <0.5 MG/DL (ref 0.6–1.1)
FOLATE: >20 NG/ML (ref 4.78–24.2)
GFR SERPL CREATININE-BSD FRML MDRD: >60 ML/MIN/{1.73_M2}
GLUCOSE BLD-MCNC: 105 MG/DL (ref 70–99)
HDLC SERPL-MCNC: 60 MG/DL (ref 40–60)
LDL CHOLESTEROL CALCULATED: 73 MG/DL
LIPASE: 204 U/L (ref 13–60)
MAGNESIUM: 2 MG/DL (ref 1.8–2.4)
POTASSIUM SERPL-SCNC: 4.4 MMOL/L (ref 3.5–5.1)
SODIUM BLD-SCNC: 140 MMOL/L (ref 136–145)
TOTAL PROTEIN: 6.6 G/DL (ref 6.4–8.2)
TRIGL SERPL-MCNC: 145 MG/DL (ref 0–150)
VITAMIN B-12: 319 PG/ML (ref 211–911)
VITAMIN D 25-HYDROXY: 10.6 NG/ML
VLDLC SERPL CALC-MCNC: 29 MG/DL

## 2023-02-07 RX ORDER — CHOLECALCIFEROL (VITAMIN D3) 50 MCG
2000 TABLET ORAL DAILY
Qty: 90 TABLET | Refills: 3 | Status: SHIPPED | OUTPATIENT
Start: 2023-02-07

## 2023-03-14 PROBLEM — D12.6 TUBULAR ADENOMA OF COLON: Status: ACTIVE | Noted: 2023-03-14

## 2023-05-17 ENCOUNTER — INPATIENT HOSPITAL (OUTPATIENT)
Dept: URBAN - METROPOLITAN AREA HOSPITAL 56 | Facility: HOSPITAL | Age: 48
End: 2023-05-17
Payer: MEDICAID

## 2023-05-17 DIAGNOSIS — Z72.0 TOBACCO USE: ICD-10-CM

## 2023-05-17 DIAGNOSIS — F10.21 ALCOHOL DEPENDENCE, IN REMISSION: ICD-10-CM

## 2023-05-17 DIAGNOSIS — K85.21 ALCOHOL INDUCED ACUTE PANCREATITIS WITH UNINFECTED NECROSIS: ICD-10-CM

## 2023-05-17 PROCEDURE — 99255 IP/OBS CONSLTJ NEW/EST HI 80: CPT | Performed by: NURSE PRACTITIONER

## 2023-05-18 ENCOUNTER — INPATIENT HOSPITAL (OUTPATIENT)
Dept: URBAN - METROPOLITAN AREA HOSPITAL 56 | Facility: HOSPITAL | Age: 48
End: 2023-05-18
Payer: MEDICAID

## 2023-05-18 DIAGNOSIS — F10.21 ALCOHOL DEPENDENCE, IN REMISSION: ICD-10-CM

## 2023-05-18 DIAGNOSIS — K85.21 ALCOHOL INDUCED ACUTE PANCREATITIS WITH UNINFECTED NECROSIS: ICD-10-CM

## 2023-05-18 DIAGNOSIS — Z72.0 TOBACCO USE: ICD-10-CM

## 2023-05-18 DIAGNOSIS — E43 UNSPECIFIED SEVERE PROTEIN-CALORIE MALNUTRITION: ICD-10-CM

## 2023-05-18 PROCEDURE — 99233 SBSQ HOSP IP/OBS HIGH 50: CPT | Performed by: PHYSICIAN ASSISTANT

## 2023-05-19 PROCEDURE — 99233 SBSQ HOSP IP/OBS HIGH 50: CPT | Performed by: PHYSICIAN ASSISTANT

## 2023-05-20 ENCOUNTER — INPATIENT HOSPITAL (OUTPATIENT)
Dept: URBAN - METROPOLITAN AREA HOSPITAL 56 | Facility: HOSPITAL | Age: 48
End: 2023-05-20
Payer: MEDICAID

## 2023-05-20 DIAGNOSIS — F10.21 ALCOHOL DEPENDENCE, IN REMISSION: ICD-10-CM

## 2023-05-20 DIAGNOSIS — E43 UNSPECIFIED SEVERE PROTEIN-CALORIE MALNUTRITION: ICD-10-CM

## 2023-05-20 DIAGNOSIS — K85.21 ALCOHOL INDUCED ACUTE PANCREATITIS WITH UNINFECTED NECROSIS: ICD-10-CM

## 2023-05-20 PROCEDURE — 99233 SBSQ HOSP IP/OBS HIGH 50: CPT

## 2023-05-21 PROCEDURE — 99233 SBSQ HOSP IP/OBS HIGH 50: CPT

## 2023-05-23 ENCOUNTER — INPATIENT HOSPITAL (OUTPATIENT)
Dept: URBAN - METROPOLITAN AREA HOSPITAL 56 | Facility: HOSPITAL | Age: 48
End: 2023-05-23
Payer: MEDICAID

## 2023-05-23 DIAGNOSIS — F10.21 ALCOHOL DEPENDENCE, IN REMISSION: ICD-10-CM

## 2023-05-23 DIAGNOSIS — E43 UNSPECIFIED SEVERE PROTEIN-CALORIE MALNUTRITION: ICD-10-CM

## 2023-05-23 DIAGNOSIS — K85.21 ALCOHOL INDUCED ACUTE PANCREATITIS WITH UNINFECTED NECROSIS: ICD-10-CM

## 2023-05-23 PROCEDURE — 99232 SBSQ HOSP IP/OBS MODERATE 35: CPT | Performed by: PHYSICIAN ASSISTANT

## 2023-05-24 PROCEDURE — 99232 SBSQ HOSP IP/OBS MODERATE 35: CPT | Performed by: PHYSICIAN ASSISTANT

## 2023-05-25 ENCOUNTER — INPATIENT HOSPITAL (OUTPATIENT)
Dept: URBAN - METROPOLITAN AREA HOSPITAL 56 | Facility: HOSPITAL | Age: 48
End: 2023-05-25
Payer: MEDICAID

## 2023-05-25 DIAGNOSIS — K85.21 ALCOHOL INDUCED ACUTE PANCREATITIS WITH UNINFECTED NECROSIS: ICD-10-CM

## 2023-05-25 DIAGNOSIS — E43 UNSPECIFIED SEVERE PROTEIN-CALORIE MALNUTRITION: ICD-10-CM

## 2023-05-25 DIAGNOSIS — F10.21 ALCOHOL DEPENDENCE, IN REMISSION: ICD-10-CM

## 2023-05-25 PROCEDURE — 99232 SBSQ HOSP IP/OBS MODERATE 35: CPT | Performed by: PHYSICIAN ASSISTANT

## 2023-05-31 ENCOUNTER — OFFICE VISIT (OUTPATIENT)
Dept: FAMILY MEDICINE CLINIC | Age: 48
End: 2023-05-31
Payer: MEDICAID

## 2023-05-31 VITALS
DIASTOLIC BLOOD PRESSURE: 80 MMHG | TEMPERATURE: 97.3 F | WEIGHT: 81.2 LBS | SYSTOLIC BLOOD PRESSURE: 132 MMHG | BODY MASS INDEX: 13.51 KG/M2 | HEART RATE: 96 BPM

## 2023-05-31 DIAGNOSIS — R63.4 WEIGHT LOSS: ICD-10-CM

## 2023-05-31 DIAGNOSIS — K86.0 ALCOHOL-INDUCED CHRONIC PANCREATITIS (HCC): Primary | ICD-10-CM

## 2023-05-31 DIAGNOSIS — D63.8 ANEMIA IN OTHER CHRONIC DISEASES CLASSIFIED ELSEWHERE: ICD-10-CM

## 2023-05-31 DIAGNOSIS — M62.84 SARCOPENIA: ICD-10-CM

## 2023-05-31 PROBLEM — K85.90 PANCREATITIS, UNSPECIFIED PANCREATITIS TYPE: Status: ACTIVE | Noted: 2023-05-17

## 2023-05-31 PROBLEM — K85.90 PANCREATITIS, UNSPECIFIED PANCREATITIS TYPE: Status: RESOLVED | Noted: 2023-05-17 | Resolved: 2023-05-31

## 2023-05-31 PROCEDURE — 4004F PT TOBACCO SCREEN RCVD TLK: CPT | Performed by: FAMILY MEDICINE

## 2023-05-31 PROCEDURE — 99214 OFFICE O/P EST MOD 30 MIN: CPT | Performed by: FAMILY MEDICINE

## 2023-05-31 PROCEDURE — 36415 COLL VENOUS BLD VENIPUNCTURE: CPT | Performed by: FAMILY MEDICINE

## 2023-05-31 PROCEDURE — G8418 CALC BMI BLW LOW PARAM F/U: HCPCS | Performed by: FAMILY MEDICINE

## 2023-05-31 PROCEDURE — G8427 DOCREV CUR MEDS BY ELIG CLIN: HCPCS | Performed by: FAMILY MEDICINE

## 2023-05-31 RX ORDER — OXYCODONE HYDROCHLORIDE 5 MG/1
5 TABLET ORAL EVERY 6 HOURS PRN
Qty: 30 TABLET | Refills: 0 | Status: SHIPPED | OUTPATIENT
Start: 2023-05-31 | End: 2023-06-10

## 2023-05-31 RX ORDER — TRAMADOL HYDROCHLORIDE 50 MG/1
50 TABLET ORAL EVERY 4 HOURS PRN
Qty: 42 TABLET | Refills: 0 | Status: SHIPPED | OUTPATIENT
Start: 2023-05-31 | End: 2023-06-12

## 2023-05-31 RX ORDER — OXYCODONE HYDROCHLORIDE 5 MG/1
5 TABLET ORAL EVERY 6 HOURS
COMMUNITY
Start: 2023-05-25 | End: 2023-05-31 | Stop reason: SDUPTHER

## 2023-05-31 SDOH — ECONOMIC STABILITY: INCOME INSECURITY: HOW HARD IS IT FOR YOU TO PAY FOR THE VERY BASICS LIKE FOOD, HOUSING, MEDICAL CARE, AND HEATING?: NOT HARD AT ALL

## 2023-05-31 SDOH — ECONOMIC STABILITY: FOOD INSECURITY: WITHIN THE PAST 12 MONTHS, YOU WORRIED THAT YOUR FOOD WOULD RUN OUT BEFORE YOU GOT MONEY TO BUY MORE.: NEVER TRUE

## 2023-05-31 SDOH — ECONOMIC STABILITY: FOOD INSECURITY: WITHIN THE PAST 12 MONTHS, THE FOOD YOU BOUGHT JUST DIDN'T LAST AND YOU DIDN'T HAVE MONEY TO GET MORE.: NEVER TRUE

## 2023-05-31 SDOH — ECONOMIC STABILITY: HOUSING INSECURITY
IN THE LAST 12 MONTHS, WAS THERE A TIME WHEN YOU DID NOT HAVE A STEADY PLACE TO SLEEP OR SLEPT IN A SHELTER (INCLUDING NOW)?: NO

## 2023-05-31 NOTE — ASSESSMENT & PLAN NOTE
She has weight loss and sarcopenia and a very low BMI. I encouraged her to look for plant-based protein and to consider eggs. I will also attempt to put in a nutritionist referral.  I will also put in a referral to a physical therapist to help her with strengthening.

## 2023-05-31 NOTE — PROGRESS NOTES
5/31/23    Seble Hernandez  1975    SUBJECTIVE    HPI - Sonia Shah is a 50 y.o. female who presents today for evaluation of:  Chief Complaint   Patient presents with    Follow-Up from Hospital     Had to check blood sugar every 4 hours in the hospital and had to take insulin a couple times because of not eating     Pain     Needs referrals to pain management      Hospital f/u pancreatitis : was in hospitla or ER 7 times since Nov 2022. Was most recently in hospital at [facility] 5/17/23 to 5/25/23. Since 5/25 she has still had pain but not enough to go to hospital. Pain is worse if she eats and if sits doing nothing sometimes. When got out of hospital they put her on oxycodone. Her GI doctor plans to see her Friday and do a biopsy about a mass on pancreas. GI doctor has told her to avoid hamburger and to avoid fats and to eat plant based diet. That has helped a little. Weight is down 19 # since Feb 6. She was told to get a pain management referral from me. He GI doctor won't prescribe oxycodone. She was told they could only prescribe 7 d at hospital discharge. No Known Allergies     OBJECTIVE    /80 (Site: Left Upper Arm, Position: Sitting, Cuff Size: Child)   Pulse 96   Temp 97.3 °F (36.3 °C) (Infrared)   Wt 81 lb 3.2 oz (36.8 kg)   BMI 13.51 kg/m²     Physical Exam   Constitutional:       General: Not in acute distress. Appearance: very thin appearing not ill-appearing. Eyes:      General: No scleral icterus. Cardiovascular:      Rate and Rhythm: Normal rate and regular rhythm. Heart sounds: No murmur heard. No friction rub. No gallop. Pulmonary:      Effort: Pulmonary effort is normal. No respiratory distress. Breath sounds: No wheezing, rhonchi or rales. Abdominal:      Palpations: Abdomen is soft. There is no mass. Tenderness: There is mild upper abd pain. Musculoskeletal:     Moves all extremities normally. Skin:     General: Skin is warm.       Coloration: Skin is

## 2023-05-31 NOTE — ASSESSMENT & PLAN NOTE
She has chronic pancreatitis. She is working with a gastroenterologist.  Hopefully the pancreatic mass turns out to be inflammation rather than malignancy. I will recheck a lipase. I will recheck a CBC and CMP. I will do a referral to a pain clinic and specified the pain clinics that were suggested at hospital discharge. I will continue the oxycodone that is controlling her pain and also prescribed tramadol. I feel that she is not likely to be abusing the oxycodone and her  states the same. She would prefer to use tramadol if that can control her pain adequately. She is trying to do the best that she can with a plant-based diet.

## 2023-06-01 LAB
ABSOLUTE IMMATURE GRANULOCYTE: 0 K/UL (ref 0–0.1)
ALBUMIN/GLOBULIN RATIO: 2 RATIO (ref 0.8–2.6)
ALBUMIN: 4.4 G/DL (ref 3.5–5.2)
ALP BLD-CCNC: 103 U/L (ref 23–144)
ALT SERPL-CCNC: 12 U/L (ref 0–60)
AST SERPL-CCNC: 18 U/L (ref 0–55)
BASOPHILS ABSOLUTE: 0.1 K/UL (ref 0–0.3)
BASOPHILS RELATIVE PERCENT: 0.7 % (ref 0–2)
BILIRUB SERPL-MCNC: 0.3 MG/DL (ref 0–1.2)
BUN BLDV-MCNC: 9 MG/DL (ref 3–29)
BUN/CREAT BLD: 23 (ref 7–25)
CALCIUM SERPL-MCNC: 10 MG/DL (ref 8.5–10.5)
CHLORIDE BLD-SCNC: 97 MEQ/L (ref 96–110)
CO2: 21 MEQ/L (ref 19–32)
CREAT SERPL-MCNC: 0.4 MG/DL (ref 0.5–1.2)
DIFFERENTIAL TYPE: ABNORMAL
EOSINOPHILS ABSOLUTE: 0.2 K/UL (ref 0–0.5)
EOSINOPHILS RELATIVE PERCENT: 2.2 % (ref 0–5)
FOLATE: 8.6 NG/ML (ref 3.4–5.4)
GLOBULIN: 2.2 G/DL (ref 1.9–3.6)
GLOMERULAR FILTRATION RATE: 121 MLS/MIN/1.73M2
GLUCOSE BLD-MCNC: 140 MG/DL (ref 70–99)
HCT VFR BLD CALC: 40.6 % (ref 34–49)
HEMOGLOBIN: 13.3 G/DL (ref 11.2–15.7)
IMMATURE GRANULOCYTES: 0.3 %
IRON SATURATION: 17 % (ref 12–57)
IRON: 52 MCG/DL (ref 35–175)
LIPASE: 112 U/L (ref 0–60)
LYMPHOCYTES ABSOLUTE: 3.8 K/UL (ref 0.9–4.1)
LYMPHOCYTES RELATIVE PERCENT: 37.7 % (ref 14–51)
MCH RBC QN AUTO: 29.9 PG (ref 26–34)
MCHC RBC AUTO-ENTMCNC: 32.8 G/DL (ref 30.7–35.5)
MCV RBC AUTO: 91.2 FL (ref 80–100)
MONOCYTES ABSOLUTE: 0.6 K/UL (ref 0.2–1)
MONOCYTES RELATIVE PERCENT: 5.9 % (ref 4–12)
NEUTROPHILS ABSOLUTE: 5.3 K/UL (ref 1.8–7.5)
NEUTROPHILS RELATIVE PERCENT: 53.2 % (ref 42–80)
NUCLEATED RBCS: 0 /100 WBC
PDW BLD-RTO: 13.5 %
PLATELET # BLD: 453 K/UL (ref 140–400)
PMV BLD AUTO: 12.2 FL (ref 7.2–11.7)
POTASSIUM SERPL-SCNC: 4.4 MEQ/L (ref 3.4–5.3)
RBC # BLD: 4.45 M/UL (ref 3.95–5.26)
SODIUM BLD-SCNC: 135 MEQ/L (ref 135–148)
STATUS: ABNORMAL
TOTAL IRON BINDING CAPACITY: 303 MCG/DL (ref 200–450)
TOTAL PROTEIN: 6.6 G/DL (ref 6–8.3)
VITAMIN B-12: 509 PG/ML (ref 200–1100)
WBC: 10 K/UL (ref 3.5–10.9)

## 2023-06-02 ENCOUNTER — OFFICE (OUTPATIENT)
Dept: URBAN - METROPOLITAN AREA CLINIC 13 | Facility: CLINIC | Age: 48
End: 2023-06-02
Payer: MEDICAID

## 2023-06-02 VITALS
SYSTOLIC BLOOD PRESSURE: 102 MMHG | HEIGHT: 66 IN | WEIGHT: 83 LBS | DIASTOLIC BLOOD PRESSURE: 60 MMHG | HEART RATE: 77 BPM

## 2023-06-02 DIAGNOSIS — K86.1 OTHER CHRONIC PANCREATITIS: ICD-10-CM

## 2023-06-02 DIAGNOSIS — F10.90 ALCOHOL USE, UNSPECIFIED, UNCOMPLICATED: ICD-10-CM

## 2023-06-02 DIAGNOSIS — K85.90 ACUTE PANCREATITIS WITHOUT NECROSIS OR INFECTION, UNSPECIFIE: ICD-10-CM

## 2023-06-02 PROCEDURE — 99213 OFFICE O/P EST LOW 20 MIN: CPT | Mod: SA | Performed by: NURSE PRACTITIONER

## 2023-06-02 RX ORDER — PANCRELIPASE LIPASE, PANCRELIPASE PROTEASE, PANCRELIPASE AMYLASE 25000; 79000; 105000 [USP'U]/1; [USP'U]/1; [USP'U]/1
CAPSULE, DELAYED RELEASE ORAL
Qty: 180 | Refills: 2 | Status: ACTIVE
Start: 2022-10-13

## 2023-06-02 RX ORDER — PREGABALIN 150 MG/1
CAPSULE ORAL
Qty: 60 | Refills: 5 | Status: COMPLETED
Start: 2022-11-04 | End: 2024-07-23 | Stop reason: SDUPTHER

## 2023-06-15 ENCOUNTER — TELEPHONE (OUTPATIENT)
Dept: FAMILY MEDICINE CLINIC | Age: 48
End: 2023-06-15

## 2023-06-15 DIAGNOSIS — K86.0 ALCOHOL-INDUCED CHRONIC PANCREATITIS (HCC): ICD-10-CM

## 2023-06-15 RX ORDER — TRAMADOL HYDROCHLORIDE 50 MG/1
50 TABLET ORAL EVERY 6 HOURS PRN
Qty: 42 TABLET | Refills: 0 | Status: SHIPPED | OUTPATIENT
Start: 2023-06-15 | End: 2023-06-29 | Stop reason: SDUPTHER

## 2023-06-29 ENCOUNTER — OFFICE VISIT (OUTPATIENT)
Dept: FAMILY MEDICINE CLINIC | Age: 48
End: 2023-06-29
Payer: MEDICAID

## 2023-06-29 VITALS
TEMPERATURE: 96.9 F | WEIGHT: 78.2 LBS | HEART RATE: 82 BPM | BODY MASS INDEX: 13.03 KG/M2 | DIASTOLIC BLOOD PRESSURE: 82 MMHG | OXYGEN SATURATION: 90 % | SYSTOLIC BLOOD PRESSURE: 110 MMHG | HEIGHT: 65 IN

## 2023-06-29 DIAGNOSIS — D63.8 ANEMIA IN OTHER CHRONIC DISEASES CLASSIFIED ELSEWHERE: ICD-10-CM

## 2023-06-29 DIAGNOSIS — M62.84 SARCOPENIA: ICD-10-CM

## 2023-06-29 DIAGNOSIS — K86.0 ALCOHOL-INDUCED CHRONIC PANCREATITIS (HCC): Primary | ICD-10-CM

## 2023-06-29 PROCEDURE — 99214 OFFICE O/P EST MOD 30 MIN: CPT | Performed by: FAMILY MEDICINE

## 2023-06-29 PROCEDURE — G8418 CALC BMI BLW LOW PARAM F/U: HCPCS | Performed by: FAMILY MEDICINE

## 2023-06-29 PROCEDURE — 4004F PT TOBACCO SCREEN RCVD TLK: CPT | Performed by: FAMILY MEDICINE

## 2023-06-29 PROCEDURE — G8427 DOCREV CUR MEDS BY ELIG CLIN: HCPCS | Performed by: FAMILY MEDICINE

## 2023-06-29 RX ORDER — OXYCODONE HYDROCHLORIDE AND ACETAMINOPHEN 5; 325 MG/1; MG/1
1 TABLET ORAL EVERY 8 HOURS PRN
Qty: 15 TABLET | Refills: 0 | Status: SHIPPED | OUTPATIENT
Start: 2023-06-29 | End: 2023-07-07

## 2023-06-29 RX ORDER — PROMETHAZINE HYDROCHLORIDE 25 MG/1
25 TABLET ORAL EVERY 6 HOURS PRN
Qty: 30 TABLET | Refills: 1 | Status: SHIPPED | OUTPATIENT
Start: 2023-06-29

## 2023-06-29 RX ORDER — ONDANSETRON 8 MG/1
8 TABLET, ORALLY DISINTEGRATING ORAL 3 TIMES DAILY PRN
Qty: 30 TABLET | Refills: 1 | Status: SHIPPED | OUTPATIENT
Start: 2023-06-29

## 2023-06-29 RX ORDER — TRAMADOL HYDROCHLORIDE 50 MG/1
50 TABLET ORAL EVERY 6 HOURS PRN
Qty: 42 TABLET | Refills: 0 | Status: SHIPPED | OUTPATIENT
Start: 2023-06-29 | End: 2023-07-11

## 2023-06-29 ASSESSMENT — ENCOUNTER SYMPTOMS
ABDOMINAL PAIN: 1
CONSTIPATION: 1
DIARRHEA: 0

## 2023-07-07 ENCOUNTER — AMBULATORY SURGICAL CENTER (OUTPATIENT)
Dept: URBAN - METROPOLITAN AREA SURGERY 7 | Facility: SURGERY | Age: 48
End: 2023-07-07
Payer: MEDICAID

## 2023-07-07 VITALS
HEIGHT: 67 IN | OXYGEN SATURATION: 100 % | RESPIRATION RATE: 16 BRPM | TEMPERATURE: 96.7 F | HEART RATE: 74 BPM | SYSTOLIC BLOOD PRESSURE: 80 MMHG | WEIGHT: 73 LBS | DIASTOLIC BLOOD PRESSURE: 82 MMHG | HEART RATE: 86 BPM | HEART RATE: 78 BPM | DIASTOLIC BLOOD PRESSURE: 57 MMHG | SYSTOLIC BLOOD PRESSURE: 96 MMHG | OXYGEN SATURATION: 98 % | HEART RATE: 74 BPM | SYSTOLIC BLOOD PRESSURE: 120 MMHG | HEIGHT: 67 IN | HEART RATE: 82 BPM | WEIGHT: 73 LBS | HEART RATE: 86 BPM | RESPIRATION RATE: 10 BRPM | RESPIRATION RATE: 52 BRPM | TEMPERATURE: 96.7 F | DIASTOLIC BLOOD PRESSURE: 76 MMHG | DIASTOLIC BLOOD PRESSURE: 52 MMHG | DIASTOLIC BLOOD PRESSURE: 52 MMHG | RESPIRATION RATE: 52 BRPM | DIASTOLIC BLOOD PRESSURE: 67 MMHG | SYSTOLIC BLOOD PRESSURE: 96 MMHG | SYSTOLIC BLOOD PRESSURE: 80 MMHG | DIASTOLIC BLOOD PRESSURE: 57 MMHG | RESPIRATION RATE: 10 BRPM | RESPIRATION RATE: 16 BRPM | DIASTOLIC BLOOD PRESSURE: 66 MMHG | HEART RATE: 78 BPM | OXYGEN SATURATION: 99 % | RESPIRATION RATE: 18 BRPM | DIASTOLIC BLOOD PRESSURE: 76 MMHG | DIASTOLIC BLOOD PRESSURE: 67 MMHG | OXYGEN SATURATION: 99 % | OXYGEN SATURATION: 100 % | DIASTOLIC BLOOD PRESSURE: 82 MMHG | RESPIRATION RATE: 18 BRPM | DIASTOLIC BLOOD PRESSURE: 66 MMHG | SYSTOLIC BLOOD PRESSURE: 78 MMHG | SYSTOLIC BLOOD PRESSURE: 120 MMHG | SYSTOLIC BLOOD PRESSURE: 109 MMHG | OXYGEN SATURATION: 98 % | HEART RATE: 82 BPM | SYSTOLIC BLOOD PRESSURE: 78 MMHG | SYSTOLIC BLOOD PRESSURE: 109 MMHG | SYSTOLIC BLOOD PRESSURE: 105 MMHG | SYSTOLIC BLOOD PRESSURE: 105 MMHG

## 2023-07-07 DIAGNOSIS — K86.1 OTHER CHRONIC PANCREATITIS: ICD-10-CM

## 2023-07-07 DIAGNOSIS — K86.0 ALCOHOL-INDUCED CHRONIC PANCREATITIS: ICD-10-CM

## 2023-07-07 PROCEDURE — 43237 ENDOSCOPIC US EXAM ESOPH: CPT | Performed by: INTERNAL MEDICINE

## 2023-07-12 ENCOUNTER — OFFICE VISIT (OUTPATIENT)
Dept: FAMILY MEDICINE CLINIC | Age: 48
End: 2023-07-12
Payer: MEDICAID

## 2023-07-12 VITALS
WEIGHT: 78.4 LBS | SYSTOLIC BLOOD PRESSURE: 92 MMHG | HEIGHT: 65 IN | TEMPERATURE: 97.3 F | BODY MASS INDEX: 13.06 KG/M2 | DIASTOLIC BLOOD PRESSURE: 60 MMHG

## 2023-07-12 DIAGNOSIS — K86.0 ALCOHOL-INDUCED CHRONIC PANCREATITIS (HCC): Primary | ICD-10-CM

## 2023-07-12 LAB — DIABETIC RETINOPATHY: NEGATIVE

## 2023-07-12 PROCEDURE — G8418 CALC BMI BLW LOW PARAM F/U: HCPCS | Performed by: FAMILY MEDICINE

## 2023-07-12 PROCEDURE — G8427 DOCREV CUR MEDS BY ELIG CLIN: HCPCS | Performed by: FAMILY MEDICINE

## 2023-07-12 PROCEDURE — 99213 OFFICE O/P EST LOW 20 MIN: CPT | Performed by: FAMILY MEDICINE

## 2023-07-12 PROCEDURE — 4004F PT TOBACCO SCREEN RCVD TLK: CPT | Performed by: FAMILY MEDICINE

## 2023-07-12 RX ORDER — TRAMADOL HYDROCHLORIDE 50 MG/1
50 TABLET ORAL EVERY 8 HOURS PRN
Qty: 90 TABLET | Refills: 0 | Status: SHIPPED | OUTPATIENT
Start: 2023-07-12 | End: 2023-08-11

## 2023-07-12 ASSESSMENT — ENCOUNTER SYMPTOMS
ANAL BLEEDING: 0
DIARRHEA: 0
NAUSEA: 1
ABDOMINAL DISTENTION: 0
BLOOD IN STOOL: 0
VOMITING: 0
ABDOMINAL PAIN: 1
CONSTIPATION: 0

## 2023-07-21 ENCOUNTER — OFFICE (OUTPATIENT)
Dept: URBAN - METROPOLITAN AREA CLINIC 17 | Facility: CLINIC | Age: 48
End: 2023-07-21
Payer: MEDICAID

## 2023-07-21 VITALS
HEART RATE: 92 BPM | DIASTOLIC BLOOD PRESSURE: 80 MMHG | OXYGEN SATURATION: 98 % | HEIGHT: 67 IN | SYSTOLIC BLOOD PRESSURE: 100 MMHG | WEIGHT: 78 LBS

## 2023-07-21 DIAGNOSIS — K86.1 OTHER CHRONIC PANCREATITIS: ICD-10-CM

## 2023-07-21 PROCEDURE — 99213 OFFICE O/P EST LOW 20 MIN: CPT | Mod: SA | Performed by: PHYSICIAN ASSISTANT

## 2023-07-21 RX ORDER — PANCRELIPASE LIPASE, PANCRELIPASE PROTEASE, PANCRELIPASE AMYLASE 25000; 79000; 105000 [USP'U]/1; [USP'U]/1; [USP'U]/1
CAPSULE, DELAYED RELEASE ORAL
Qty: 180 | Refills: 2 | Status: ACTIVE
Start: 2022-10-13

## 2023-08-11 ENCOUNTER — OFFICE VISIT (OUTPATIENT)
Dept: FAMILY MEDICINE CLINIC | Age: 48
End: 2023-08-11
Payer: MEDICAID

## 2023-08-11 VITALS
TEMPERATURE: 97.7 F | BODY MASS INDEX: 14.31 KG/M2 | WEIGHT: 86 LBS | HEART RATE: 87 BPM | DIASTOLIC BLOOD PRESSURE: 60 MMHG | SYSTOLIC BLOOD PRESSURE: 100 MMHG | OXYGEN SATURATION: 98 %

## 2023-08-11 DIAGNOSIS — K86.0 ALCOHOL-INDUCED CHRONIC PANCREATITIS (HCC): Primary | ICD-10-CM

## 2023-08-11 DIAGNOSIS — M62.84 SARCOPENIA: ICD-10-CM

## 2023-08-11 DIAGNOSIS — E53.8 FOLATE DEFICIENCY: ICD-10-CM

## 2023-08-11 DIAGNOSIS — Z01.419 VISIT FOR GYNECOLOGIC EXAMINATION: ICD-10-CM

## 2023-08-11 PROBLEM — F41.1 GENERALIZED ANXIETY DISORDER: Status: ACTIVE | Noted: 2023-08-11

## 2023-08-11 PROCEDURE — 99213 OFFICE O/P EST LOW 20 MIN: CPT | Performed by: FAMILY MEDICINE

## 2023-08-11 PROCEDURE — G8418 CALC BMI BLW LOW PARAM F/U: HCPCS | Performed by: FAMILY MEDICINE

## 2023-08-11 PROCEDURE — 4004F PT TOBACCO SCREEN RCVD TLK: CPT | Performed by: FAMILY MEDICINE

## 2023-08-11 PROCEDURE — G8428 CUR MEDS NOT DOCUMENT: HCPCS | Performed by: FAMILY MEDICINE

## 2023-08-11 RX ORDER — TIZANIDINE 2 MG/1
2 TABLET ORAL EVERY 6 HOURS PRN
COMMUNITY

## 2023-08-11 RX ORDER — TRAMADOL HYDROCHLORIDE 50 MG/1
50 TABLET ORAL EVERY 8 HOURS PRN
Qty: 90 TABLET | Refills: 0 | Status: SHIPPED | OUTPATIENT
Start: 2023-08-11 | End: 2023-09-10

## 2023-08-11 RX ORDER — FOLIC ACID 1 MG/1
1 TABLET ORAL DAILY
Qty: 90 TABLET | Refills: 1 | Status: SHIPPED | OUTPATIENT
Start: 2023-08-11

## 2023-08-11 ASSESSMENT — ENCOUNTER SYMPTOMS
DIARRHEA: 0
NAUSEA: 1
VOMITING: 0
ABDOMINAL PAIN: 1

## 2023-08-11 NOTE — ASSESSMENT & PLAN NOTE
Pain seems to be controlled with tramadol take in roughly 2-1/2-3 times a day. I will prescribe tramadol with a prescription for another 90 tablets.

## 2023-08-11 NOTE — ASSESSMENT & PLAN NOTE
Urged her to take folic acid daily. She may need to get the prescription as a supplement rather than expecting insurance to pay for the prescription.

## 2023-08-11 NOTE — ASSESSMENT & PLAN NOTE
I am delighted that she has gained 8 pounds. Encouraged continued consumption of frequent amounts of high-protein foods. Encouraged experimenting with fat-free dairy products such as yogurt.   Encouraged staying away from fatty products or increased amounts of ice cream.

## 2023-08-18 ENCOUNTER — OFFICE (OUTPATIENT)
Dept: URBAN - METROPOLITAN AREA CLINIC 17 | Facility: CLINIC | Age: 48
End: 2023-08-18
Payer: MEDICAID

## 2023-08-18 VITALS
DIASTOLIC BLOOD PRESSURE: 66 MMHG | HEIGHT: 67 IN | HEART RATE: 92 BPM | WEIGHT: 85 LBS | SYSTOLIC BLOOD PRESSURE: 90 MMHG | OXYGEN SATURATION: 96 %

## 2023-08-18 DIAGNOSIS — Z86.010 PERSONAL HISTORY OF COLONIC POLYPS: ICD-10-CM

## 2023-08-18 DIAGNOSIS — K59.09 OTHER CONSTIPATION: ICD-10-CM

## 2023-08-18 DIAGNOSIS — R63.6 UNDERWEIGHT: ICD-10-CM

## 2023-08-18 DIAGNOSIS — K86.9 DISEASE OF PANCREAS, UNSPECIFIED: ICD-10-CM

## 2023-08-18 PROCEDURE — 99213 OFFICE O/P EST LOW 20 MIN: CPT | Mod: SA | Performed by: PHYSICIAN ASSISTANT

## 2023-08-23 ENCOUNTER — HOSPITAL ENCOUNTER (OUTPATIENT)
Dept: WOMENS IMAGING | Age: 48
Discharge: HOME | End: 2023-08-23
Payer: MEDICAID

## 2023-08-23 DIAGNOSIS — M62.84 SARCOPENIA: ICD-10-CM

## 2023-08-23 PROCEDURE — 77080 DXA BONE DENSITY AXIAL: CPT

## 2023-09-18 ENCOUNTER — OFFICE VISIT (OUTPATIENT)
Dept: FAMILY MEDICINE CLINIC | Age: 48
End: 2023-09-18
Payer: MEDICAID

## 2023-09-18 VITALS
SYSTOLIC BLOOD PRESSURE: 92 MMHG | HEART RATE: 92 BPM | TEMPERATURE: 97.5 F | DIASTOLIC BLOOD PRESSURE: 64 MMHG | OXYGEN SATURATION: 99 % | BODY MASS INDEX: 15.64 KG/M2 | WEIGHT: 94 LBS

## 2023-09-18 DIAGNOSIS — M81.0 AGE-RELATED OSTEOPOROSIS WITHOUT CURRENT PATHOLOGICAL FRACTURE: ICD-10-CM

## 2023-09-18 DIAGNOSIS — M62.84 SARCOPENIA: ICD-10-CM

## 2023-09-18 DIAGNOSIS — K86.0 ALCOHOL-INDUCED CHRONIC PANCREATITIS (HCC): Primary | ICD-10-CM

## 2023-09-18 PROCEDURE — 99213 OFFICE O/P EST LOW 20 MIN: CPT | Performed by: FAMILY MEDICINE

## 2023-09-18 PROCEDURE — 4004F PT TOBACCO SCREEN RCVD TLK: CPT | Performed by: FAMILY MEDICINE

## 2023-09-18 PROCEDURE — G8418 CALC BMI BLW LOW PARAM F/U: HCPCS | Performed by: FAMILY MEDICINE

## 2023-09-18 PROCEDURE — G8427 DOCREV CUR MEDS BY ELIG CLIN: HCPCS | Performed by: FAMILY MEDICINE

## 2023-09-18 RX ORDER — TRAMADOL HYDROCHLORIDE 50 MG/1
50 TABLET ORAL EVERY 8 HOURS PRN
COMMUNITY
Start: 2023-09-13

## 2023-09-18 NOTE — ASSESSMENT & PLAN NOTE
She has chronic pancreatitis that intermittently causes pain. It is unpredictable enough that she cannot be relied on for typical employment so I wrote a letter to that effect. She has tramadol available from a pain management doctor. If she needs additional ondansetron or promethazine I would be happy to refill those. She does not need them at this time. She is trying to eat foods that do not have fats but do have protein such as beef jerky. Encouraged that she continue to do this. Suggested low-fat dairy products. She does already consume some yogurt.

## 2023-10-09 ENCOUNTER — OFFICE VISIT (OUTPATIENT)
Dept: FAMILY MEDICINE CLINIC | Age: 48
End: 2023-10-09
Payer: MEDICAID

## 2023-10-09 VITALS
BODY MASS INDEX: 16.43 KG/M2 | WEIGHT: 98.6 LBS | DIASTOLIC BLOOD PRESSURE: 60 MMHG | HEIGHT: 65 IN | HEART RATE: 88 BPM | TEMPERATURE: 97.9 F | SYSTOLIC BLOOD PRESSURE: 90 MMHG | OXYGEN SATURATION: 92 %

## 2023-10-09 DIAGNOSIS — M81.0 AGE-RELATED OSTEOPOROSIS WITHOUT CURRENT PATHOLOGICAL FRACTURE: Primary | ICD-10-CM

## 2023-10-09 DIAGNOSIS — K86.0 ALCOHOL-INDUCED CHRONIC PANCREATITIS (HCC): ICD-10-CM

## 2023-10-09 DIAGNOSIS — M62.84 SARCOPENIA: ICD-10-CM

## 2023-10-09 PROCEDURE — 4004F PT TOBACCO SCREEN RCVD TLK: CPT | Performed by: FAMILY MEDICINE

## 2023-10-09 PROCEDURE — G8484 FLU IMMUNIZE NO ADMIN: HCPCS | Performed by: FAMILY MEDICINE

## 2023-10-09 PROCEDURE — G8427 DOCREV CUR MEDS BY ELIG CLIN: HCPCS | Performed by: FAMILY MEDICINE

## 2023-10-09 PROCEDURE — G8418 CALC BMI BLW LOW PARAM F/U: HCPCS | Performed by: FAMILY MEDICINE

## 2023-10-09 PROCEDURE — 99212 OFFICE O/P EST SF 10 MIN: CPT | Performed by: FAMILY MEDICINE

## 2023-10-09 NOTE — ASSESSMENT & PLAN NOTE
Encouraged her to avoid greasy foods and consider being strictly vegan to reduce pain from chronic pancreatitis.

## 2023-10-18 ENCOUNTER — OFFICE VISIT (OUTPATIENT)
Dept: FAMILY MEDICINE CLINIC | Age: 48
End: 2023-10-18
Payer: MEDICAID

## 2023-10-18 VITALS
BODY MASS INDEX: 15.48 KG/M2 | HEART RATE: 91 BPM | DIASTOLIC BLOOD PRESSURE: 70 MMHG | SYSTOLIC BLOOD PRESSURE: 104 MMHG | WEIGHT: 93 LBS | OXYGEN SATURATION: 95 % | TEMPERATURE: 97.4 F

## 2023-10-18 DIAGNOSIS — E53.8 FOLATE DEFICIENCY: ICD-10-CM

## 2023-10-18 DIAGNOSIS — M62.84 SARCOPENIA: ICD-10-CM

## 2023-10-18 DIAGNOSIS — K86.0 ALCOHOL-INDUCED CHRONIC PANCREATITIS (HCC): ICD-10-CM

## 2023-10-18 DIAGNOSIS — R53.83 FATIGUE, UNSPECIFIED TYPE: ICD-10-CM

## 2023-10-18 DIAGNOSIS — M81.0 AGE-RELATED OSTEOPOROSIS WITHOUT CURRENT PATHOLOGICAL FRACTURE: ICD-10-CM

## 2023-10-18 DIAGNOSIS — F17.200 SMOKING: ICD-10-CM

## 2023-10-18 DIAGNOSIS — R68.89 COLD INTOLERANCE: ICD-10-CM

## 2023-10-18 DIAGNOSIS — G62.9 NEUROPATHY: ICD-10-CM

## 2023-10-18 DIAGNOSIS — Z00.00 ENCOUNTER FOR WELL ADULT EXAM WITHOUT ABNORMAL FINDINGS: Primary | ICD-10-CM

## 2023-10-18 DIAGNOSIS — R06.2 WHEEZING: ICD-10-CM

## 2023-10-18 PROBLEM — D63.8 ANEMIA IN OTHER CHRONIC DISEASES CLASSIFIED ELSEWHERE: Status: RESOLVED | Noted: 2022-07-20 | Resolved: 2023-10-18

## 2023-10-18 PROBLEM — R63.4 WEIGHT LOSS: Status: RESOLVED | Noted: 2023-05-31 | Resolved: 2023-10-18

## 2023-10-18 PROCEDURE — G8418 CALC BMI BLW LOW PARAM F/U: HCPCS | Performed by: FAMILY MEDICINE

## 2023-10-18 PROCEDURE — G8427 DOCREV CUR MEDS BY ELIG CLIN: HCPCS | Performed by: FAMILY MEDICINE

## 2023-10-18 PROCEDURE — G8484 FLU IMMUNIZE NO ADMIN: HCPCS | Performed by: FAMILY MEDICINE

## 2023-10-18 PROCEDURE — 4004F PT TOBACCO SCREEN RCVD TLK: CPT | Performed by: FAMILY MEDICINE

## 2023-10-18 PROCEDURE — 99214 OFFICE O/P EST MOD 30 MIN: CPT | Performed by: FAMILY MEDICINE

## 2023-10-18 PROCEDURE — 99396 PREV VISIT EST AGE 40-64: CPT | Performed by: FAMILY MEDICINE

## 2023-10-18 PROCEDURE — 36415 COLL VENOUS BLD VENIPUNCTURE: CPT | Performed by: FAMILY MEDICINE

## 2023-10-18 RX ORDER — PROMETHAZINE HYDROCHLORIDE 25 MG/1
25 TABLET ORAL EVERY 6 HOURS PRN
Qty: 30 TABLET | Refills: 1 | Status: SHIPPED | OUTPATIENT
Start: 2023-10-18

## 2023-10-18 RX ORDER — OXYCODONE HYDROCHLORIDE AND ACETAMINOPHEN 5; 325 MG/1; MG/1
1 TABLET ORAL EVERY 8 HOURS PRN
Qty: 15 TABLET | Refills: 0 | Status: SHIPPED | OUTPATIENT
Start: 2023-10-18 | End: 2023-10-26

## 2023-10-18 RX ORDER — ONDANSETRON 8 MG/1
8 TABLET, ORALLY DISINTEGRATING ORAL 3 TIMES DAILY PRN
Qty: 30 TABLET | Refills: 1 | Status: SHIPPED | OUTPATIENT
Start: 2023-10-18

## 2023-10-18 RX ORDER — TRAMADOL HYDROCHLORIDE 50 MG/1
50 TABLET ORAL EVERY 8 HOURS PRN
Qty: 270 TABLET | Refills: 0 | Status: SHIPPED | OUTPATIENT
Start: 2023-10-18 | End: 2024-01-16

## 2023-10-18 RX ORDER — ALBUTEROL SULFATE 90 UG/1
2 AEROSOL, METERED RESPIRATORY (INHALATION) 4 TIMES DAILY PRN
Qty: 18 G | Refills: 0 | Status: SHIPPED | OUTPATIENT
Start: 2023-10-18

## 2023-10-18 RX ORDER — TIZANIDINE 2 MG/1
2 TABLET ORAL EVERY 8 HOURS PRN
Qty: 270 TABLET | Refills: 1 | Status: SHIPPED | OUTPATIENT
Start: 2023-10-18

## 2023-10-18 ASSESSMENT — ENCOUNTER SYMPTOMS
CONSTIPATION: 0
DIARRHEA: 0
TROUBLE SWALLOWING: 0
APNEA: 0
NAUSEA: 0
ABDOMINAL PAIN: 1
BACK PAIN: 1
SHORTNESS OF BREATH: 0
WHEEZING: 1
COUGH: 0
VOMITING: 0
EYE PAIN: 0

## 2023-10-18 NOTE — ASSESSMENT & PLAN NOTE
Encouraged smoking cessation. I will prescribe an inhaler for albuterol so that she can use her own rather than borrowing her 's.

## 2023-10-18 NOTE — ASSESSMENT & PLAN NOTE
She has chronic pancreatitis and occasionally has flares. Occasionally she will need oxycodone. She uses tramadol quite frequently although she tries not to. Reviewed criteria for use disorder and it does not appear that there is a problem. I counseled her that if she thought that she might be developing a problem that we would want to address it. She thinks she gets a little pain relief from the pregabalin but that is prescribed by her gastroenterology doctor.   She also reports some GI comfort benefit from tizanidine so I am refilling the tizanidine

## 2023-10-18 NOTE — ASSESSMENT & PLAN NOTE
She uses pregabalin to treat her neuropathy.   Strongly encouraged her to make sure she is taking folate

## 2023-10-18 NOTE — ASSESSMENT & PLAN NOTE
We will check a TSH since she has cold intolerance and fatigue.   We will check a CBC because she has fatigue

## 2023-10-18 NOTE — PROGRESS NOTES
10/18/23    Bill Lebron  1975  50 y.o. female     Adult Annual Preventive Visit   Chief Complaint   Patient presents with    1 Month Follow-Up     Requesting pain meds for upcoming travel     Discuss Labs     Patient would like yearly labs done before they leave for trip        Chronic Conditions:     Dianne salinas on pregabalin. Risk Assessment:  Activity habits: She is not active and spends a lot of time in bed. She feels that she could get up more and her  does to    Eating habits: She is eating healthfully. Sleep habits: She does get enough sleep    Social support: She has good social connection    Stressors: She has a lot of stressors in her life including getting her teeth and dealing with her chronic pancreatitis. Review of Systems   Constitutional:  Positive for fatigue. Negative for fever. HENT:  Negative for nosebleeds and trouble swallowing. Eyes:  Positive for visual disturbance. Negative for pain. Respiratory:  Positive for wheezing. Negative for apnea, cough and shortness of breath. Cardiovascular:  Negative for chest pain and leg swelling. Gastrointestinal:  Positive for abdominal pain. Negative for constipation, diarrhea, nausea and vomiting. Endocrine: Positive for cold intolerance. Negative for heat intolerance and polyuria. Genitourinary:  Negative for difficulty urinating and hematuria. Musculoskeletal:  Positive for arthralgias, back pain and gait problem. Negative for neck pain. Skin:  Negative for rash and wound. Allergic/Immunologic: Negative for environmental allergies, food allergies and immunocompromised state. Neurological:  Positive for dizziness, light-headedness, numbness and headaches. Negative for seizures and speech difficulty. Hematological:  Negative for adenopathy. Does not bruise/bleed easily. Psychiatric/Behavioral:  Positive for sleep disturbance. Negative for decreased concentration, dysphoric mood and suicidal ideas.

## 2023-10-19 LAB
ALBUMIN SERPL-MCNC: 4.4 G/DL (ref 3.4–5)
ALBUMIN/GLOB SERPL: 1.9 {RATIO} (ref 1.1–2.2)
ALP SERPL-CCNC: 117 U/L (ref 40–129)
ALT SERPL-CCNC: 9 U/L (ref 10–40)
ANION GAP SERPL CALCULATED.3IONS-SCNC: 11 MMOL/L (ref 3–16)
AST SERPL-CCNC: 13 U/L (ref 15–37)
BASOPHILS # BLD: 0.1 K/UL (ref 0–0.2)
BASOPHILS NFR BLD: 0.9 %
BILIRUB SERPL-MCNC: 0.3 MG/DL (ref 0–1)
BUN SERPL-MCNC: 9 MG/DL (ref 7–20)
CALCIUM SERPL-MCNC: 9.4 MG/DL (ref 8.3–10.6)
CHLORIDE SERPL-SCNC: 96 MMOL/L (ref 99–110)
CO2 SERPL-SCNC: 28 MMOL/L (ref 21–32)
CREAT SERPL-MCNC: <0.5 MG/DL (ref 0.6–1.1)
DEPRECATED RDW RBC AUTO: 13.6 % (ref 12.4–15.4)
EOSINOPHIL # BLD: 0.2 K/UL (ref 0–0.6)
EOSINOPHIL NFR BLD: 2.2 %
GFR SERPLBLD CREATININE-BSD FMLA CKD-EPI: >60 ML/MIN/{1.73_M2}
GLUCOSE SERPL-MCNC: 65 MG/DL (ref 70–99)
HCT VFR BLD AUTO: 43.7 % (ref 36–48)
HGB BLD-MCNC: 14.7 G/DL (ref 12–16)
IRON SATN MFR SERPL: 17 % (ref 15–50)
IRON SERPL-MCNC: 72 UG/DL (ref 37–145)
LIPASE SERPL-CCNC: 61 U/L (ref 13–60)
LYMPHOCYTES # BLD: 2.9 K/UL (ref 1–5.1)
LYMPHOCYTES NFR BLD: 38.1 %
MCH RBC QN AUTO: 30.6 PG (ref 26–34)
MCHC RBC AUTO-ENTMCNC: 33.6 G/DL (ref 31–36)
MCV RBC AUTO: 91 FL (ref 80–100)
MONOCYTES # BLD: 0.7 K/UL (ref 0–1.3)
MONOCYTES NFR BLD: 9.8 %
NEUTROPHILS # BLD: 3.7 K/UL (ref 1.7–7.7)
NEUTROPHILS NFR BLD: 49 %
PLATELET # BLD AUTO: 224 K/UL (ref 135–450)
PMV BLD AUTO: 10.8 FL (ref 5–10.5)
POTASSIUM SERPL-SCNC: 3.6 MMOL/L (ref 3.5–5.1)
PROT SERPL-MCNC: 6.7 G/DL (ref 6.4–8.2)
RBC # BLD AUTO: 4.8 M/UL (ref 4–5.2)
SODIUM SERPL-SCNC: 135 MMOL/L (ref 136–145)
TIBC SERPL-MCNC: 413 UG/DL (ref 260–445)
TSH SERPL DL<=0.005 MIU/L-ACNC: 0.64 UIU/ML (ref 0.27–4.2)
WBC # BLD AUTO: 7.6 K/UL (ref 4–11)

## 2023-11-10 ENCOUNTER — OFFICE VISIT (OUTPATIENT)
Dept: FAMILY MEDICINE CLINIC | Age: 48
End: 2023-11-10
Payer: MEDICAID

## 2023-11-10 VITALS
TEMPERATURE: 97.7 F | HEIGHT: 65 IN | DIASTOLIC BLOOD PRESSURE: 78 MMHG | WEIGHT: 105.8 LBS | OXYGEN SATURATION: 100 % | SYSTOLIC BLOOD PRESSURE: 118 MMHG | BODY MASS INDEX: 17.63 KG/M2 | HEART RATE: 69 BPM

## 2023-11-10 DIAGNOSIS — K86.0 ALCOHOL-INDUCED CHRONIC PANCREATITIS (HCC): Primary | ICD-10-CM

## 2023-11-10 DIAGNOSIS — R06.2 WHEEZING: ICD-10-CM

## 2023-11-10 DIAGNOSIS — R10.13 EPIGASTRIC PAIN: ICD-10-CM

## 2023-11-10 PROCEDURE — G8418 CALC BMI BLW LOW PARAM F/U: HCPCS | Performed by: FAMILY MEDICINE

## 2023-11-10 PROCEDURE — G8427 DOCREV CUR MEDS BY ELIG CLIN: HCPCS | Performed by: FAMILY MEDICINE

## 2023-11-10 PROCEDURE — G8484 FLU IMMUNIZE NO ADMIN: HCPCS | Performed by: FAMILY MEDICINE

## 2023-11-10 PROCEDURE — 4004F PT TOBACCO SCREEN RCVD TLK: CPT | Performed by: FAMILY MEDICINE

## 2023-11-10 PROCEDURE — 99214 OFFICE O/P EST MOD 30 MIN: CPT | Performed by: FAMILY MEDICINE

## 2023-11-10 RX ORDER — ONDANSETRON 8 MG/1
8 TABLET, ORALLY DISINTEGRATING ORAL 3 TIMES DAILY PRN
Qty: 30 TABLET | Refills: 4 | Status: SHIPPED | OUTPATIENT
Start: 2023-11-10

## 2023-11-10 RX ORDER — PROMETHAZINE HYDROCHLORIDE 25 MG/1
25 TABLET ORAL EVERY 6 HOURS PRN
Qty: 30 TABLET | Refills: 4 | Status: SHIPPED | OUTPATIENT
Start: 2023-11-10

## 2023-11-10 RX ORDER — ALBUTEROL SULFATE 90 UG/1
2 AEROSOL, METERED RESPIRATORY (INHALATION) 4 TIMES DAILY PRN
Qty: 18 G | Refills: 5 | Status: SHIPPED | OUTPATIENT
Start: 2023-11-10

## 2023-11-10 RX ORDER — HYDROCODONE BITARTRATE AND ACETAMINOPHEN 5; 325 MG/1; MG/1
1 TABLET ORAL EVERY 6 HOURS PRN
Qty: 25 TABLET | Refills: 0 | Status: SHIPPED | OUTPATIENT
Start: 2023-11-10 | End: 2023-11-17

## 2023-11-10 ASSESSMENT — ENCOUNTER SYMPTOMS
WHEEZING: 0
COUGH: 0
BLOOD IN STOOL: 0
ANAL BLEEDING: 0
DIARRHEA: 0
ABDOMINAL DISTENTION: 1
CONSTIPATION: 0
SHORTNESS OF BREATH: 0
ABDOMINAL PAIN: 1

## 2023-11-10 NOTE — ASSESSMENT & PLAN NOTE
She had a fairly severe episode of pancreatitis that resulted in pain that apparently impaired respiration to the point that her oxygen sat got down to the 70s and her lactic acid luis to the point that she was admitted. Apparently the pain from pancreatitis with the pancreas sitting right below the diaphragm made breathing so painful that perhaps she hypoventilated.

## 2023-11-14 ENCOUNTER — TELEPHONE (OUTPATIENT)
Dept: CARDIOLOGY CLINIC | Age: 48
End: 2023-11-14

## 2023-11-14 NOTE — TELEPHONE ENCOUNTER
Left message for patient requesting a return call to schedule a consult with Wendy for epigastric pain per referral from Dr. Greenfield.

## 2023-11-17 ENCOUNTER — TELEPHONE (OUTPATIENT)
Dept: CARDIOLOGY CLINIC | Age: 48
End: 2023-11-17

## 2023-11-29 ENCOUNTER — TELEPHONE (OUTPATIENT)
Dept: CARDIOLOGY CLINIC | Age: 48
End: 2023-11-29

## 2024-03-07 ENCOUNTER — TELEPHONE (OUTPATIENT)
Dept: FAMILY MEDICINE CLINIC | Age: 49
End: 2024-03-07

## 2024-03-07 NOTE — TELEPHONE ENCOUNTER
Patient called office requesting a refill of her Norco and her Tramadol sent to [facility] in [place]. Patient states she is only in town until Sunday and if you want to call her to verify that you can.

## 2024-03-07 NOTE — TELEPHONE ENCOUNTER
Since she has not been seen in this office since November, she needs an appointment for this controlled substance.  It looks like other people have been prescribing it since Dr. Greenfield prescribed

## 2024-03-11 ENCOUNTER — OFFICE VISIT (OUTPATIENT)
Dept: FAMILY MEDICINE CLINIC | Age: 49
End: 2024-03-11
Payer: MEDICAID

## 2024-03-11 ENCOUNTER — TELEPHONE (OUTPATIENT)
Dept: FAMILY MEDICINE CLINIC | Age: 49
End: 2024-03-11

## 2024-03-11 VITALS
SYSTOLIC BLOOD PRESSURE: 110 MMHG | OXYGEN SATURATION: 98 % | DIASTOLIC BLOOD PRESSURE: 70 MMHG | TEMPERATURE: 97.2 F | WEIGHT: 100.6 LBS | HEART RATE: 83 BPM | BODY MASS INDEX: 16.74 KG/M2

## 2024-03-11 DIAGNOSIS — M62.84 SARCOPENIA: ICD-10-CM

## 2024-03-11 DIAGNOSIS — K86.0 ALCOHOL-INDUCED CHRONIC PANCREATITIS (HCC): Primary | ICD-10-CM

## 2024-03-11 PROBLEM — R56.9 SEIZURE (HCC): Status: RESOLVED | Noted: 2021-09-06 | Resolved: 2024-03-11

## 2024-03-11 PROCEDURE — 3017F COLORECTAL CA SCREEN DOC REV: CPT | Performed by: FAMILY MEDICINE

## 2024-03-11 PROCEDURE — G8418 CALC BMI BLW LOW PARAM F/U: HCPCS | Performed by: FAMILY MEDICINE

## 2024-03-11 PROCEDURE — 4004F PT TOBACCO SCREEN RCVD TLK: CPT | Performed by: FAMILY MEDICINE

## 2024-03-11 PROCEDURE — 99213 OFFICE O/P EST LOW 20 MIN: CPT | Performed by: FAMILY MEDICINE

## 2024-03-11 PROCEDURE — G8427 DOCREV CUR MEDS BY ELIG CLIN: HCPCS | Performed by: FAMILY MEDICINE

## 2024-03-11 PROCEDURE — G8484 FLU IMMUNIZE NO ADMIN: HCPCS | Performed by: FAMILY MEDICINE

## 2024-03-11 RX ORDER — TRAMADOL HYDROCHLORIDE 50 MG/1
50 TABLET ORAL EVERY 8 HOURS PRN
Qty: 90 TABLET | Refills: 0 | Status: SHIPPED | OUTPATIENT
Start: 2024-03-11 | End: 2024-04-10

## 2024-03-11 RX ORDER — TRAMADOL HYDROCHLORIDE 50 MG/1
50 TABLET ORAL EVERY 8 HOURS PRN
Qty: 90 TABLET | Refills: 0 | Status: SHIPPED | OUTPATIENT
Start: 2024-03-11 | End: 2024-03-11

## 2024-03-11 RX ORDER — HYDROCODONE BITARTRATE AND ACETAMINOPHEN 5; 325 MG/1; MG/1
1 TABLET ORAL EVERY 6 HOURS PRN
Qty: 25 TABLET | Refills: 0 | Status: SHIPPED | OUTPATIENT
Start: 2024-03-11 | End: 2024-03-18

## 2024-03-11 ASSESSMENT — PATIENT HEALTH QUESTIONNAIRE - PHQ9
SUM OF ALL RESPONSES TO PHQ QUESTIONS 1-9: 0
1. LITTLE INTEREST OR PLEASURE IN DOING THINGS: 0
2. FEELING DOWN, DEPRESSED OR HOPELESS: 0
SUM OF ALL RESPONSES TO PHQ9 QUESTIONS 1 & 2: 0

## 2024-03-11 NOTE — ASSESSMENT & PLAN NOTE
Encouraged eating ice cream and beans or lentils.  Suggested trying the lowest fat form of hamburger.

## 2024-03-11 NOTE — ASSESSMENT & PLAN NOTE
She is stable.  She struggles with avoiding foods that might trigger pancreatitis.  She has not drank alcohol at all since when she was diagnosed with pancreatitis other than perhaps a couple of sips at New Year's.  I will put up refill of tramadol and hydrocodone that she occasionally uses.  She has remaining prescriptions for ondansetron and Phenergan that she uses for nausea.

## 2024-03-11 NOTE — PROGRESS NOTES
3/11/24    Shante Rubi  1975    SUBJECTIVE    HPI - Shante is a 48 y.o. female who presents today for evaluation of:  Chief Complaint   Patient presents with    Medication Refill     Pain medication refill       Pancreatitis : has been \"not too bad\" and has had a couple mild episodes and one bad enough to be in hospital a few short times and then a 5 day. Started with a seizure possibly from quitting EtOH.  That was about 1-1/2 years ago.  Eating - doing pretty well. Soups if not eating well. Occ hamburger or pizza.  She is able to tolerate beans.  On 1 occasion she develops pancreatitis almost immediately after eating a blizzard ice cream.  She can tolerate yogurt.  She cannot chew peanuts.        No Known Allergies     OBJECTIVE    /70 (Site: Left Upper Arm, Position: Sitting, Cuff Size: Medium Adult)   Pulse 83   Temp 97.2 °F (36.2 °C) (Infrared)   Wt 45.6 kg (100 lb 9.6 oz)   SpO2 98%   BMI 16.74 kg/m²     Physical Exam   Constitutional:       General: Not in acute distress.     Appearance: Normal appearance. Not ill-appearing.   Eyes:      General: No scleral icterus.  Cardiovascular:      Rate and Rhythm: Normal rate and regular rhythm.      Heart sounds: No murmur heard.   No friction rub. No gallop.    Pulmonary:      Effort: Pulmonary effort is normal. No respiratory distress.      Breath sounds: No wheezing, rhonchi or rales.   Abdominal:      Palpations: Abdomen is soft. There is no mass.      Tenderness: There is mild abdominal tenderness.   Musculoskeletal:     Moves all extremities normally.    Skin:     General: Skin is warm.      Coloration: Skin is not jaundiced.   Neurological:      Mental Status: Patient is alert.   Psychiatric:         Behavior: Behavior normal.         Thought Content: Thought content normal.         Judgment: Judgment normal.    PDMP reviewed and last tramadol was 90 pills about a month ago and there were 2 smaller prescriptions for hydrocodone from other

## 2024-03-11 NOTE — TELEPHONE ENCOUNTER
Pharmacy called regarding sig on Tramadol. Sig states  Take 1 tablet by mouth every 8 hours as needed for Pain for up to 30 days. Intended supply: 7 days. Take lowest dose possible to manage pain Max Daily Amount: 150 mg- pharmacy states if its intended supply 7 days then the dispense quantity needs changed to 21, if its for 30 days then they need it resent with the 7 days taken out. Pharmacy is cancelling last prescription and states if it is changed and resent then no need to call back.

## 2024-05-27 NOTE — ASSESSMENT & PLAN NOTE
Radiation Oncology Consultation    Bing Adams  : 1983  MRN: [id number]    Date of Consult: 2024    Primary Physician: Nicole Hernandez APNP  Referring Physician:  VALERIE Jackman         Cancer Diagnosis: Clinical Stage IIA (cT3, cN0, cM0, G2, ER+, IL+, HER2-) right breast upper outer quadrant (9:30) moderately differentiated invasive lobular carcinoma, up to 1.2 cm, ER positive, IL positive, HER 2 equivocal FISH non amplified.     Chief Complaint: Breast MDC    History of Prior Radiation Therapy:   None    History of Present Illness:  Bing Adams is a 41 year old female newly diagnosed right breast cancer.     5/10/24 Patient palpated right breast mass while lying down in bed     24 Bilateral diagnostic mammogram was performed that showed left breast to be benign and in the right breast near 9:00 a focal area of architectural distortion correlating with the area of clinical concern. This measures 3.5 cm with mammography     24 Right breast ultrasound reveals an irregular subtle hypoechoic shadowing mass epicentered at the 9:30, 5 cm FTN, measuring up to 3 cm. Right axilla is without suspicious abnormality     24: Right breast biopsy performed:     Pathologic Diagnosis   Right breast, 9:30 (5 cm from nipple), ultrasound-guided core needle biopsy:   -Moderately differentiated invasive lobular carcinoma, up to 1.2 cm.  -See synoptic report/diagnosis comment.      INVASIVE CARCINOMA OF THE BREAST: Biopsy     SITE: Right breast 9:30, 5 cm from nipple     INVASIVE CARCINOMA  HISTOLOGIC TYPE: Invasive lobular carcinoma  HISTOLOGIC GRADE: Moderately-differentiated                 Tubular Differentiation:       3/3                 Nuclear Pleomorphism:      2/3                 Mitotic Rate:                        2/3                 [place] Score:              7/9     GREATEST EXTENT OF INVASIVE CARCINOMA (in any single core): 1.2 cm  LYMPHOVASCULAR INVASION:  Not Identified  DUCTAL  Strongly encouraged getting up and walking around and if possible some lifting CARCINOMA IN SITU: Not Identified  MICROCALCIFICATIONS:  Not Identified     BREAST BIOMARKERS  Block: A1     Estrogen Receptor: POSITIVE               90% of tumor nuclei, moderate staining.     Progesterone Receptor: POSITIVE               90% of tumor nuclei, strong staining.      FISH: NON-AMPLIFIED (NEGATIVE)       24 MRI bilateral breast:  1.  Dominant irregular mass in the right breast near 9 o'clock corresponds to the site of biopsy-proven malignancy and measures up to 2.2 cm. There is a larger area of contiguous abnormal non-mass enhancement extending both anterior and posterior to the biopsy-proven malignant mass that measures up to 6.0 cm. This likely corresponds to the larger area of architectural distortion seen mammographically that surrounds the mass.  2.  Non-mass enhancement in the right medial breast near 4 o'clock measures up to 2.4 cm and is of lower suspicion. This could potentially represent background parenchymal enhancement associated with normal fibroglandular tissue.  3.  No MRI evidence of malignancy in the left breast.  4.  No suspicious axillary or internal mammary lymphadenopathy.    Potential contraindications to radiation therapy:   History of Connective Tissue Disorder: negative  History of Inflammatory Bowel Disease: negative  Pacemaker / AICD / implant: negative     Current Pregnancy Status: NA  Reason for exclusion:  Female patient with history of tubal ligation     Social Narrative: The patient is . She is the mother of three children. She is a former smoker. She works for [employer] in [employer].       Past Medical History:   Diagnosis Date    ADHD (attention deficit hyperactivity disorder)     Diagnosed in 4th grade    Anxiety     Bipolar 2 disorder, major depressive episode  (CMD) 2012    Depressive disorder        Past Surgical History:  Past Surgical History:   Procedure Laterality Date     section, low transverse      D and c      Tonsillectomy       Tubal ligation         Medications:  Current Outpatient Medications   Medication Sig Dispense Refill    amphetamine-dextroamphetamine (ADDERALL XR) 30 MG 24 hr capsule Take 1 capsule by mouth every morning. 30 capsule 0    semaglutide-Weight Management (Wegovy) 1 MG/0.5ML injection Inject 1 mg into the skin 1 day a week. 2 mL 0    traZODone (DESYREL) 100 MG tablet Take 1-2 tablets by mouth every evening. 180 tablet 0    buPROPion XL (WELLBUTRIN XL) 300 MG 24 hr tablet Take 1 tablet by mouth every morning. 90 tablet 0    citalopram (CeleXA) 40 MG tablet Take 1 tablet by mouth at bedtime. 90 tablet 0    Lurasidone HCl (Latuda) 60 MG Tab Take 60 mg by mouth at bedtime. 90 tablet 0    norgestimate-ethinyl estradiol, triphasic, (ORTHO TRI-CYCLEN) 0.18/0.215/0.25 MG-35 MCG tablet Take 1 tablet by mouth daily. 84 tablet 3    spironolactone (ALDACTONE) 100 MG tablet Take 1 tablet by mouth daily. 90 tablet 3    amphetamine-dextroamphetamine (Adderall) 30 MG tablet TAKE 1 TABLET TWICE DAILY. 60 tablet 0    amphetamine-dextroamphetamine (Adderall XR) 30 MG 24 hr capsule TAKE 1 CAPSULE DAILY IN THE MORNING. 30 capsule 0    amphetamine-dextroamphetamine (Adderall XR) 30 MG 24 hr capsule TAKE 1 CAPSULE DAILY IN THE MORNING. 30 capsule 0    amphetamine-dextroamphetamine (Adderall) 30 MG tablet TAKE 1 TABLET TWICE DAILY. 60 tablet 0    amphetamine-dextroamphetamine (Adderall) 30 MG tablet TAKE 1 TABLET TWICE DAILY. 60 tablet 0     No current facility-administered medications for this visit.       Allergies:  ALLERGIES:   Allergen Reactions    Nickel HIVES       Family History:  family history includes Cancer in her maternal grandfather; Depression in her mother; Kidney disease in her mother.    Social History:  Social History     Socioeconomic History    Marital status:      Spouse name: Not on file    Number of children: Not on file    Years of education: Not on file    Highest education level: Not on file   Occupational  History    Not on file   Tobacco Use    Smoking status: Former     Types: Cigarettes     Start date: 2014    Smokeless tobacco: Never   Substance and Sexual Activity    Alcohol use: Yes     Comment: 1 glass every other month on occasion    Drug use: No    Sexual activity: Yes     Partners: Male   Other Topics Concern    Not on file   Social History Narrative    Not on file     Social Determinants of Health     Financial Resource Strain: Not on file   Food Insecurity: Not on file   Transportation Needs: Not on file   Physical Activity: Not on file   Stress: Not on file   Social Connections: Not on file   Interpersonal Safety: Not on file       Review of Systems:  I have reviewed the ROS as recorded by the nursing staff at today's visit and discussed the pertinent positives and negatives with the patient.     Pain:   Pain assessment tool (0 -10)  Pain Score: 0  Physical Exam:  Visit Vitals  /70 (Patient Position: Sitting)   Pulse (!) 106   Temp 98.2 °F (36.8 °C)   Resp 16   SpO2 97%     GENERAL: appears stated age, is in no apparent distress, is well developed , well nourished, and well groomed  PSYCH: normal mood and affect, pleasant, and speech and behavior appropriate  SKIN: normal color, normal texture and turgor to touch, no skin rashes observed, no atypical appearing skin lesions, and warm and dry to touch  NECK: full range of motion  NEUROLOGIC: normal mental status, oriented to person, place, date/time, and cranial nerves 2 through 12 grossly normal  BREAST EXAM: Irregularly lobulated R breast mass extending 7-9 o'clock and mobile. No skin distortion, edema or erythema. No axillary adenopathy. L Breast and axilla unremarkable    ECOG Performance Status: 0: Fully active, able to carry on all pre-disease performance without restriction    Laboratory Data:  No results found for: \"SODIUM\"  No results found for: \"POTASSIUM\"  No results found for: \"CHLORIDE\"  No results found for: \"CO2\"  No results found for:  \"BUN\"  No results found for: \"CREATININE\"  No results found for: \"GLUCOSE\"  No results found for: \"CALCIUM\"  No results found for: \"BILIRUBIN\"  No results found for: \"AST\"  No results found for: \"GPT\"  No results found for: \"ALBUMIN\"  No results found for: \"ALKPT\"  No results found for: \"WBC\"  No results found for: \"HGB\"  No results found for: \"HCT\"  No results found for: \"PLT\"  No results found for: \"ANEUT\"      Review of Imaging:  As noted in the History of Present Illness.  The recent imaging was personally reviewed.    Impression:  Clinical Stage IIA (cT3, cN0, cM0, G2, ER+, UT+, HER2-) right breast upper outer quadrant (9:30) moderately differentiated invasive lobular carcinoma, up to 1.2 cm, ER positive, UT positive, HER 2 equivocal FISH non amplified.     Recommendations:  Per multidisciplinary review, her disease is amenable to breast conserving therapy if patient desires. MRI breast noted what appears to be larger lesion than described on initial imaging with at least a 2.2. cm enhancing mass surrounded by a 6 cm abnormal non-mass enhancement (cT2 vs T3).  She met with Dr. Valle today to detail her surgical options with preference of moving towards surgery now rather than a neoadjuvant approach based on her tumor's prognostic markers. I reviewed the role for adjuvant right breast irradiation in setting of BCT. If she chooses mastectomy, she was informed of the adverse pathologic findings which may determine a recommendation for PMRT. She is considering meeting with Dr. Valle to discuss reconstruction options.     We discussed the radiation procedure, logistics, risks, alternatives and potential benefits. We discussed the possible side effects including radiation dermatitis, arm lymphedema, skin thickening, breast retraction and a low risk of pneumonitis. The very low risk of RT induced cancers was also discussed. I answered her questions to the best of my ability and she seemed satisfied.    She will return  to my clinic when appropriate after her surgery to further detail next steps in her care.     I had a discussion with the patient and advised her that the intent of treatment is curative     Thank you for including us in the care of your patient. We do hope that she does well.      OLIVIA Alvarez MD    Number and Complexity of Problems Addressed at the Encounter  [x] I explained that her breast cancer poses a threat to life/bodily function.    Amount and/or Complexity of Data to Be Reviewed and Analyzed (2 out of 3)  Category 1: Tests, documents or independent historian(s)  [x] Review of prior notes  [x] Review of tests  [] Ordering of tests  [] Assessment requiring an independent historian(s)    Category 2: Independent interpretation of tests  [x] Independent interpretation of test(s)    Category 3: Discussion of management or test interpretation  [x] Discussion of management or test interpretation with external physician Breast MDC team    Risk of Complications  [x] I explained the potentially significant morbidity arising from treatment; in particular, risks, benefits, and alternatives as well as treatment rationale were discussed in detail. All questions were answered and expectations during treatment process were reviewed. Informed consent obtained.      DISCLAIMER  The 21st Century Cures Act makes medical notes like these available to patients in the interest of transparency. However, be advised this is a medical document. It is intended as peer to peer communication. It is written in medical language and may contain abbreviations or verbiage that are unfamiliar to the general public. It may appear blunt or direct. Medical documents are intended to carry relevant information, facts as evident, and the clinical opinion of the practitioner. This document is not intended to be a comprehensive summary of the medical record. Plans may change based on information that is not conveyed in this note.

## 2024-07-03 ENCOUNTER — OFFICE VISIT (OUTPATIENT)
Dept: CARDIOLOGY CLINIC | Age: 49
End: 2024-07-03
Payer: MEDICAID

## 2024-07-03 VITALS
WEIGHT: 105 LBS | HEIGHT: 67 IN | BODY MASS INDEX: 16.48 KG/M2 | DIASTOLIC BLOOD PRESSURE: 64 MMHG | SYSTOLIC BLOOD PRESSURE: 92 MMHG

## 2024-07-03 DIAGNOSIS — R06.02 SHORTNESS OF BREATH: ICD-10-CM

## 2024-07-03 DIAGNOSIS — K86.0 ALCOHOL-INDUCED CHRONIC PANCREATITIS (HCC): Primary | ICD-10-CM

## 2024-07-03 DIAGNOSIS — R00.2 PALPITATIONS: ICD-10-CM

## 2024-07-03 PROCEDURE — G8419 CALC BMI OUT NRM PARAM NOF/U: HCPCS | Performed by: INTERNAL MEDICINE

## 2024-07-03 PROCEDURE — 99204 OFFICE O/P NEW MOD 45 MIN: CPT | Performed by: INTERNAL MEDICINE

## 2024-07-03 PROCEDURE — 3017F COLORECTAL CA SCREEN DOC REV: CPT | Performed by: INTERNAL MEDICINE

## 2024-07-03 PROCEDURE — G8427 DOCREV CUR MEDS BY ELIG CLIN: HCPCS | Performed by: INTERNAL MEDICINE

## 2024-07-03 PROCEDURE — 4004F PT TOBACCO SCREEN RCVD TLK: CPT | Performed by: INTERNAL MEDICINE

## 2024-07-03 PROCEDURE — 93000 ELECTROCARDIOGRAM COMPLETE: CPT | Performed by: INTERNAL MEDICINE

## 2024-07-03 NOTE — PROGRESS NOTES
CARDIOLOGY NOTE      7/3/2024    RE: Shante Rubi  (1975)                               TO:  Danilo Martel MD            CHIEF COMPLAINT   Shante is a 48 y.o. female who was seen today for management of abnormal EKG                                    HPI:                   Pt has h/o normal EKG, history of alcohol abuse, recent admission for pancreatitis, cachexia, seen today for establishing. Pt has been having fluttering in the chest and also some chest pain as well    Shante Rubi has the following history recorded in care path:  Patient Active Problem List    Diagnosis Date Noted    Tubular adenoma of colon 03/14/2023    Menopause 08/30/2022    Cigarette nicotine dependence without complication 07/20/2022    Hyperglycemia 07/20/2022    History of alcohol use disorder 07/20/2022    Sarcopenia 07/20/2022    Smoking 07/20/2022    Pain due to dental caries 07/20/2022    Neuropathy 07/20/2022    Fatigue 07/20/2022    Pancreatitis, Alcohol-induced chronic pancreatitis (HCC) 02/23/2019    Wheezing 10/18/2023    Osteoporosis , Age-related osteoporosis without current pathological fracture 09/18/2023    Generalized anxiety disorder 08/11/2023    Folate deficiency 08/11/2023    Adult BMI <19 kg/sq m 05/31/2023     Current Outpatient Medications   Medication Sig Dispense Refill    traMADol (ULTRAM) 50 MG tablet Take 1 tablet by mouth every 8 hours as needed for Pain for up to 30 days. Intended supply: 30+ days. Take lowest dose possible to manage pain Max Daily Amount: 150 mg 90 tablet 0    promethazine (PHENERGAN) 25 MG tablet Take 1 tablet by mouth every 6 hours as needed for Nausea 30 tablet 4    albuterol sulfate HFA (VENTOLIN HFA) 108 (90 Base) MCG/ACT inhaler Inhale 2 puffs into the lungs 4 times daily as needed for Wheezing 18 g 5    ondansetron (ZOFRAN-ODT) 8 MG TBDP disintegrating tablet Take 1 tablet by mouth 3 times daily as needed for Nausea 30 tablet 4    Handicap Placard MISC by

## 2024-07-08 ENCOUNTER — TELEPHONE (OUTPATIENT)
Dept: CARDIOLOGY CLINIC | Age: 49
End: 2024-07-08

## 2024-07-09 ENCOUNTER — OFFICE VISIT (OUTPATIENT)
Dept: FAMILY MEDICINE CLINIC | Age: 49
End: 2024-07-09
Payer: MEDICAID

## 2024-07-09 VITALS
BODY MASS INDEX: 17.29 KG/M2 | OXYGEN SATURATION: 99 % | HEART RATE: 86 BPM | SYSTOLIC BLOOD PRESSURE: 88 MMHG | WEIGHT: 103.8 LBS | DIASTOLIC BLOOD PRESSURE: 52 MMHG | HEIGHT: 65 IN

## 2024-07-09 DIAGNOSIS — M81.0 AGE-RELATED OSTEOPOROSIS WITHOUT CURRENT PATHOLOGICAL FRACTURE: ICD-10-CM

## 2024-07-09 DIAGNOSIS — K86.0 ALCOHOL-INDUCED CHRONIC PANCREATITIS (HCC): ICD-10-CM

## 2024-07-09 DIAGNOSIS — Z12.31 SCREENING MAMMOGRAM FOR BREAST CANCER: ICD-10-CM

## 2024-07-09 DIAGNOSIS — R42 DIZZINESS: Primary | ICD-10-CM

## 2024-07-09 PROCEDURE — G8419 CALC BMI OUT NRM PARAM NOF/U: HCPCS | Performed by: FAMILY MEDICINE

## 2024-07-09 PROCEDURE — 4004F PT TOBACCO SCREEN RCVD TLK: CPT | Performed by: FAMILY MEDICINE

## 2024-07-09 PROCEDURE — 3017F COLORECTAL CA SCREEN DOC REV: CPT | Performed by: FAMILY MEDICINE

## 2024-07-09 PROCEDURE — 99214 OFFICE O/P EST MOD 30 MIN: CPT | Performed by: FAMILY MEDICINE

## 2024-07-09 PROCEDURE — G8427 DOCREV CUR MEDS BY ELIG CLIN: HCPCS | Performed by: FAMILY MEDICINE

## 2024-07-09 PROCEDURE — 36415 COLL VENOUS BLD VENIPUNCTURE: CPT | Performed by: FAMILY MEDICINE

## 2024-07-09 RX ORDER — TRAMADOL HYDROCHLORIDE 50 MG/1
50 TABLET ORAL EVERY 8 HOURS PRN
Qty: 90 TABLET | Refills: 0 | Status: SHIPPED | OUTPATIENT
Start: 2024-07-09 | End: 2024-08-08

## 2024-07-09 RX ORDER — HYDROCODONE BITARTRATE AND ACETAMINOPHEN 5; 325 MG/1; MG/1
1 TABLET ORAL EVERY 6 HOURS PRN
Qty: 25 TABLET | Refills: 0 | Status: SHIPPED | OUTPATIENT
Start: 2024-07-09 | End: 2024-07-24

## 2024-07-09 SDOH — ECONOMIC STABILITY: FOOD INSECURITY: WITHIN THE PAST 12 MONTHS, YOU WORRIED THAT YOUR FOOD WOULD RUN OUT BEFORE YOU GOT MONEY TO BUY MORE.: NEVER TRUE

## 2024-07-09 SDOH — ECONOMIC STABILITY: INCOME INSECURITY: HOW HARD IS IT FOR YOU TO PAY FOR THE VERY BASICS LIKE FOOD, HOUSING, MEDICAL CARE, AND HEATING?: NOT HARD AT ALL

## 2024-07-09 SDOH — ECONOMIC STABILITY: FOOD INSECURITY: WITHIN THE PAST 12 MONTHS, THE FOOD YOU BOUGHT JUST DIDN'T LAST AND YOU DIDN'T HAVE MONEY TO GET MORE.: NEVER TRUE

## 2024-07-09 NOTE — PROGRESS NOTES
7/9/24    Shante Greyson  1975    SUBJECTIVE    HPI - Shante is a 48 y.o. female who presents today for evaluation of:  Chief Complaint   Patient presents with    Follow-up     Routine check up and blood work-    Referral - General     Wants referral sent to [facility]   Fax [phone]     Dizziness     Feels faint and dizzy feeling like she's going to pass out and gets better if she eats or drinks an ensure quickly      Chronic pancreatitis : had an episode of illness when finishing a job in [place], [place]. Weight is pretty stable.   Orthostatic sensation : drinks a lot of water.  She occasionally needs tramadol and hydrocodone to help with the chronic pancreatitis pain.        No Known Allergies     OBJECTIVE    BP (!) 88/52 (Site: Left Upper Arm, Position: Sitting, Cuff Size: Small Adult)   Pulse 86   Ht 1.657 m (5' 5.25\")   Wt 47.1 kg (103 lb 12.8 oz)   SpO2 99%   BMI 17.14 kg/m²     Physical Exam   Constitutional:       General: Not in acute distress.     Appearance: Normal appearance. Not ill-appearing.   Eyes:      General: No scleral icterus.  Cardiovascular:      Rate and Rhythm: Normal rate and regular rhythm.      Heart sounds: No murmur heard.   No friction rub. No gallop.    Neck:      Thyroid: No thyroid mass, thyromegaly or thyroid tenderness.   Lymphadenopathy:      Cervical:      Right cervical: No superficial cervical adenopathy.     Left cervical: No superficial cervical adenopathy.   Pulmonary:      Effort: Pulmonary effort is normal. No respiratory distress.      Breath sounds: No wheezing, rhonchi or rales.   Abdominal:      Palpations: Abdomen is soft. There is no mass.      Tenderness: There is no abdominal tenderness.   Musculoskeletal:     Moves all extremities normally.    Skin:     General: Skin is warm.      Coloration: Skin is not jaundiced.   Neurological:      Mental Status: Patient is alert.   Psychiatric:         Behavior: Behavior

## 2024-07-09 NOTE — ASSESSMENT & PLAN NOTE
She has chronic pancreatitis.  It is stable.  I will treat associated pain with limited amounts of hydrocodone and tramadol.

## 2024-07-10 ENCOUNTER — TELEPHONE (OUTPATIENT)
Dept: CARDIOLOGY CLINIC | Age: 49
End: 2024-07-10

## 2024-07-10 LAB
ALBUMIN SERPL-MCNC: 4.3 G/DL (ref 3.4–5)
ALBUMIN/GLOB SERPL: 1.9 {RATIO} (ref 1.1–2.2)
ALP SERPL-CCNC: 119 U/L (ref 40–129)
ALT SERPL-CCNC: 11 U/L (ref 10–40)
ANION GAP SERPL CALCULATED.3IONS-SCNC: 15 MMOL/L (ref 3–16)
AST SERPL-CCNC: 16 U/L (ref 15–37)
BASOPHILS # BLD: 0.1 K/UL (ref 0–0.2)
BASOPHILS NFR BLD: 0.7 %
BILIRUB SERPL-MCNC: 0.3 MG/DL (ref 0–1)
BUN SERPL-MCNC: 8 MG/DL (ref 7–20)
CALCIUM SERPL-MCNC: 9.7 MG/DL (ref 8.3–10.6)
CHLORIDE SERPL-SCNC: 98 MMOL/L (ref 99–110)
CO2 SERPL-SCNC: 23 MMOL/L (ref 21–32)
CREAT SERPL-MCNC: <0.5 MG/DL (ref 0.6–1.1)
DEPRECATED RDW RBC AUTO: 14.1 % (ref 12.4–15.4)
EOSINOPHIL # BLD: 0.2 K/UL (ref 0–0.6)
EOSINOPHIL NFR BLD: 1.8 %
GFR SERPLBLD CREATININE-BSD FMLA CKD-EPI: >90 ML/MIN/{1.73_M2}
GLUCOSE SERPL-MCNC: 90 MG/DL (ref 70–99)
HCT VFR BLD AUTO: 39.3 % (ref 36–48)
HGB BLD-MCNC: 13.6 G/DL (ref 12–16)
LYMPHOCYTES # BLD: 3.3 K/UL (ref 1–5.1)
LYMPHOCYTES NFR BLD: 38 %
MCH RBC QN AUTO: 32 PG (ref 26–34)
MCHC RBC AUTO-ENTMCNC: 34.5 G/DL (ref 31–36)
MCV RBC AUTO: 92.9 FL (ref 80–100)
MONOCYTES # BLD: 0.7 K/UL (ref 0–1.3)
MONOCYTES NFR BLD: 7.5 %
NEUTROPHILS # BLD: 4.6 K/UL (ref 1.7–7.7)
NEUTROPHILS NFR BLD: 52 %
PLATELET # BLD AUTO: 253 K/UL (ref 135–450)
PMV BLD AUTO: 11 FL (ref 5–10.5)
POTASSIUM SERPL-SCNC: 3.8 MMOL/L (ref 3.5–5.1)
PROT SERPL-MCNC: 6.6 G/DL (ref 6.4–8.2)
RBC # BLD AUTO: 4.23 M/UL (ref 4–5.2)
SODIUM SERPL-SCNC: 136 MMOL/L (ref 136–145)
TSH SERPL DL<=0.005 MIU/L-ACNC: 0.69 UIU/ML (ref 0.27–4.2)
WBC # BLD AUTO: 8.8 K/UL (ref 4–11)

## 2024-07-10 NOTE — TELEPHONE ENCOUNTER
Left message of normal results, ok per pt communication form.     Interpretation Summary         Left Ventricle: Hyperdynamic left ventricular systolic function with a visually estimated EF of 60 - 65%. Left ventricle size is normal. Mildly increased wall thickness. Normal wall motion. Grade I diastolic dysfunction with normal LAP.    Aortic Valve: Mildly calcified noncoronary cusp.    Mitral Valve: Mild regurgitation.    Tricuspid Valve: Mild regurgitation. The estimated RVSP is 30 mmHg.    Pericardium: No pericardial effusion.    Image quality is fair. Technically difficult study due to patient's body habitus and limited Doppler study due to patient's condition.

## 2024-07-23 ENCOUNTER — OFFICE (OUTPATIENT)
Dept: URBAN - METROPOLITAN AREA CLINIC 17 | Facility: CLINIC | Age: 49
End: 2024-07-23
Payer: MEDICAID

## 2024-07-23 VITALS
HEART RATE: 64 BPM | SYSTOLIC BLOOD PRESSURE: 108 MMHG | OXYGEN SATURATION: 95 % | WEIGHT: 102 LBS | HEIGHT: 67 IN | DIASTOLIC BLOOD PRESSURE: 68 MMHG

## 2024-07-23 DIAGNOSIS — Z86.010 PERSONAL HISTORY OF COLONIC POLYPS: ICD-10-CM

## 2024-07-23 DIAGNOSIS — R11.0 NAUSEA: ICD-10-CM

## 2024-07-23 DIAGNOSIS — R63.6 UNDERWEIGHT: ICD-10-CM

## 2024-07-23 DIAGNOSIS — K86.1 OTHER CHRONIC PANCREATITIS: ICD-10-CM

## 2024-07-23 DIAGNOSIS — K59.00 CONSTIPATION, UNSPECIFIED: ICD-10-CM

## 2024-07-23 PROBLEM — K63.5 POLYP OF COLON: Status: ACTIVE | Noted: 2022-09-09

## 2024-07-23 PROCEDURE — 99214 OFFICE O/P EST MOD 30 MIN: CPT | Performed by: PHYSICIAN ASSISTANT

## 2024-08-12 ENCOUNTER — OFFICE VISIT (OUTPATIENT)
Dept: CARDIOLOGY CLINIC | Age: 49
End: 2024-08-12
Payer: MEDICARE

## 2024-08-12 VITALS
DIASTOLIC BLOOD PRESSURE: 62 MMHG | HEIGHT: 65 IN | WEIGHT: 104.2 LBS | SYSTOLIC BLOOD PRESSURE: 94 MMHG | BODY MASS INDEX: 17.36 KG/M2 | HEART RATE: 76 BPM

## 2024-08-12 DIAGNOSIS — I73.9 CLAUDICATION (HCC): ICD-10-CM

## 2024-08-12 DIAGNOSIS — I47.10 SVT (SUPRAVENTRICULAR TACHYCARDIA) (HCC): Primary | ICD-10-CM

## 2024-08-12 PROCEDURE — G8419 CALC BMI OUT NRM PARAM NOF/U: HCPCS | Performed by: INTERNAL MEDICINE

## 2024-08-12 PROCEDURE — 3017F COLORECTAL CA SCREEN DOC REV: CPT | Performed by: INTERNAL MEDICINE

## 2024-08-12 PROCEDURE — 99214 OFFICE O/P EST MOD 30 MIN: CPT | Performed by: INTERNAL MEDICINE

## 2024-08-12 PROCEDURE — 4004F PT TOBACCO SCREEN RCVD TLK: CPT | Performed by: INTERNAL MEDICINE

## 2024-08-12 PROCEDURE — G8427 DOCREV CUR MEDS BY ELIG CLIN: HCPCS | Performed by: INTERNAL MEDICINE

## 2024-08-12 RX ORDER — METOPROLOL SUCCINATE 25 MG/1
12.5 TABLET, EXTENDED RELEASE ORAL DAILY
Qty: 30 TABLET | Refills: 3 | Status: SHIPPED | OUTPATIENT
Start: 2024-08-12

## 2024-08-12 NOTE — PROGRESS NOTES
CARDIOLOGY NOTE      8/12/2024    RE: Shante Rubi  (1975)                               TO:  Danilo Martel MD            CHIEF COMPLAINT   Shante is a 48 y.o. female who was seen today for management of abnormal EKG                            Fu on monitor        HPI:                   Pt has h/o normal EKG, history of alcohol abuse, recent admission for pancreatitis, cachexia, seen today for follow-upCarpascual Rubi has the following history recorded in care path:  Patient Active Problem List    Diagnosis Date Noted    Tubular adenoma of colon 03/14/2023    Menopause 08/30/2022    Cigarette nicotine dependence without complication 07/20/2022    Hyperglycemia 07/20/2022    History of alcohol use disorder 07/20/2022    Sarcopenia 07/20/2022    Smoking 07/20/2022    Pain due to dental caries 07/20/2022    Neuropathy 07/20/2022    Fatigue 07/20/2022    Pancreatitis, Alcohol-induced chronic pancreatitis (HCC) 02/23/2019    Wheezing 10/18/2023    Osteoporosis , Age-related osteoporosis without current pathological fracture 09/18/2023    Generalized anxiety disorder 08/11/2023    Folate deficiency 08/11/2023    Adult BMI <19 kg/sq m 05/31/2023     Current Outpatient Medications   Medication Sig Dispense Refill    HYDROcodone-acetaminophen (LORCET) 5-325 MG per tablet Take 1 tablet by mouth every 6 hours as needed for Pain for up to 15 days. Intended supply: 15 days. Take lowest dose possible to manage pain Max Daily Amount: 4 tablets 25 tablet 0    traMADol (ULTRAM) 50 MG tablet Take 1 tablet by mouth every 8 hours as needed for Pain for up to 30 days. Intended supply: 30+ days. Take lowest dose possible to manage pain Max Daily Amount: 150 mg 90 tablet 0    promethazine (PHENERGAN) 25 MG tablet Take 1 tablet by mouth every 6 hours as needed for Nausea 30 tablet 4    albuterol sulfate HFA (VENTOLIN HFA) 108 (90 Base) MCG/ACT inhaler Inhale 2 puffs into the lungs 4 times daily as needed for

## 2024-08-12 NOTE — PATIENT INSTRUCTIONS
Please be informed that if you contact our office outside of normal business hours the physician on call cannot help with any scheduling or rescheduling issues, procedure instruction questions or any type of medication issue.    We advise you for any urgent/emergency that you go to the nearest emergency room!    PLEASE CALL OUR OFFICE DURING NORMAL BUSINESS HOURS    Monday - Friday   8 am to 5 pm    [place]: [phone]    [place]: [phone]    [place]:  [phone]    Thank you for allowing us to care for you today!   We want to ensure we can follow your treatment plan and we strive to give you the best outcomes and experience possible.   If you ever have a life threatening emergency and call 911 - for an ambulance (EMS)   Our providers can only care for you at:   [facility] or [facility].   Even if you have someone take you or you drive yourself we can only care for you in a [facility] facility. Our providers are not setup at the other healthcare locations!     **It is YOUR responsibilty to bring medication bottles and/or updated medication list to EACH APPOINTMENT. This will allow us to better serve you and all your healthcare needs**

## 2024-08-21 ENCOUNTER — HOSPITAL ENCOUNTER (OUTPATIENT)
Dept: WOMENS IMAGING | Age: 49
Discharge: HOME | End: 2024-08-21
Payer: MEDICARE

## 2024-08-21 VITALS — WEIGHT: 102 LBS | BODY MASS INDEX: 17 KG/M2 | HEIGHT: 65 IN

## 2024-08-21 DIAGNOSIS — Z12.31 SCREENING MAMMOGRAM FOR BREAST CANCER: ICD-10-CM

## 2024-08-21 PROCEDURE — 77063 BREAST TOMOSYNTHESIS BI: CPT

## 2024-09-03 ENCOUNTER — TELEPHONE (OUTPATIENT)
Dept: CARDIOLOGY CLINIC | Age: 49
End: 2024-09-03

## 2024-09-05 ENCOUNTER — TELEPHONE (OUTPATIENT)
Dept: CARDIOLOGY CLINIC | Age: 49
End: 2024-09-05

## 2024-09-05 NOTE — TELEPHONE ENCOUNTER
Pt called back for results. Told pt the results below. Pt verbalized understanding and will come in for an OV to further discuss results with Dr. Levy.      Vascular US Ankle Brachial Index (LILO)      Right side findings: Resting LILO is 0.89. This is mildly decreased.    Left side findings: Resting LILO is 0.81. This is mildly decreased.    Ov to discuss

## 2024-09-05 NOTE — TELEPHONE ENCOUNTER
Pt called to receive her test results; call was transferred to [facility].

## 2024-09-24 ENCOUNTER — OFFICE VISIT (OUTPATIENT)
Dept: FAMILY MEDICINE CLINIC | Age: 49
End: 2024-09-24

## 2024-09-24 VITALS
HEART RATE: 77 BPM | BODY MASS INDEX: 16.63 KG/M2 | SYSTOLIC BLOOD PRESSURE: 100 MMHG | WEIGHT: 99.8 LBS | HEIGHT: 65 IN | OXYGEN SATURATION: 100 % | DIASTOLIC BLOOD PRESSURE: 60 MMHG | TEMPERATURE: 97.3 F

## 2024-09-24 DIAGNOSIS — E53.8 FOLATE DEFICIENCY: ICD-10-CM

## 2024-09-24 DIAGNOSIS — R10.12 LEFT UPPER QUADRANT ABDOMINAL PAIN: ICD-10-CM

## 2024-09-24 DIAGNOSIS — Z00.00 WELCOME TO MEDICARE PREVENTIVE VISIT: Primary | ICD-10-CM

## 2024-09-24 DIAGNOSIS — K86.0 ALCOHOL-INDUCED CHRONIC PANCREATITIS (HCC): ICD-10-CM

## 2024-09-24 RX ORDER — TIZANIDINE 2 MG/1
2 TABLET ORAL EVERY 8 HOURS PRN
Qty: 270 TABLET | Refills: 1 | Status: SHIPPED | OUTPATIENT
Start: 2024-09-24

## 2024-09-24 RX ORDER — PROMETHAZINE HYDROCHLORIDE 25 MG/1
25 TABLET ORAL EVERY 6 HOURS PRN
Qty: 30 TABLET | Refills: 4 | Status: SHIPPED | OUTPATIENT
Start: 2024-09-24

## 2024-09-24 RX ORDER — HYDROCODONE BITARTRATE AND ACETAMINOPHEN 5; 325 MG/1; MG/1
1 TABLET ORAL EVERY 6 HOURS PRN
Qty: 25 TABLET | Refills: 0 | Status: SHIPPED | OUTPATIENT
Start: 2024-09-24 | End: 2024-10-09

## 2024-09-24 RX ORDER — ONDANSETRON 8 MG/1
8 TABLET, ORALLY DISINTEGRATING ORAL 3 TIMES DAILY PRN
Qty: 30 TABLET | Refills: 4 | Status: SHIPPED | OUTPATIENT
Start: 2024-09-24

## 2024-09-24 RX ORDER — TRAMADOL HYDROCHLORIDE 50 MG/1
50 TABLET ORAL EVERY 8 HOURS PRN
Qty: 90 TABLET | Refills: 0 | Status: SHIPPED | OUTPATIENT
Start: 2024-09-24 | End: 2024-10-24

## 2024-09-24 ASSESSMENT — VISUAL ACUITY
OS_CC: 20/25
OD_CC: 20/20

## 2024-09-24 ASSESSMENT — ENCOUNTER SYMPTOMS
SHORTNESS OF BREATH: 1
TROUBLE SWALLOWING: 1
BACK PAIN: 1
VOMITING: 0
ABDOMINAL PAIN: 1
COUGH: 0
DIARRHEA: 0
PHOTOPHOBIA: 0
VOICE CHANGE: 0
NAUSEA: 0
SORE THROAT: 0
RHINORRHEA: 0

## 2024-09-24 ASSESSMENT — LIFESTYLE VARIABLES
HOW MANY STANDARD DRINKS CONTAINING ALCOHOL DO YOU HAVE ON A TYPICAL DAY: PATIENT DOES NOT DRINK
HOW OFTEN DO YOU HAVE A DRINK CONTAINING ALCOHOL: NEVER

## 2024-09-24 ASSESSMENT — PATIENT HEALTH QUESTIONNAIRE - PHQ9
2. FEELING DOWN, DEPRESSED OR HOPELESS: SEVERAL DAYS
SUM OF ALL RESPONSES TO PHQ QUESTIONS 1-9: 1
SUM OF ALL RESPONSES TO PHQ QUESTIONS 1-9: 1
1. LITTLE INTEREST OR PLEASURE IN DOING THINGS: NOT AT ALL
SUM OF ALL RESPONSES TO PHQ QUESTIONS 1-9: 1
SUM OF ALL RESPONSES TO PHQ9 QUESTIONS 1 & 2: 1
SUM OF ALL RESPONSES TO PHQ QUESTIONS 1-9: 1

## 2024-09-25 ENCOUNTER — OFFICE VISIT (OUTPATIENT)
Dept: CARDIOLOGY CLINIC | Age: 49
End: 2024-09-25
Payer: MEDICARE

## 2024-09-25 ENCOUNTER — CLINICAL DOCUMENTATION (OUTPATIENT)
Dept: SPIRITUAL SERVICES | Age: 49
End: 2024-09-25

## 2024-09-25 VITALS
HEIGHT: 65 IN | DIASTOLIC BLOOD PRESSURE: 70 MMHG | SYSTOLIC BLOOD PRESSURE: 100 MMHG | WEIGHT: 98.6 LBS | OXYGEN SATURATION: 100 % | BODY MASS INDEX: 16.43 KG/M2 | HEART RATE: 85 BPM

## 2024-09-25 DIAGNOSIS — I73.9 PVD (PERIPHERAL VASCULAR DISEASE) (HCC): Primary | ICD-10-CM

## 2024-09-25 DIAGNOSIS — I73.9 CLAUDICATION (HCC): ICD-10-CM

## 2024-09-25 DIAGNOSIS — I70.211 ATHEROSCLEROSIS OF NATIVE ARTERIES OF EXTREMITIES WITH INTERMITTENT CLAUDICATION, RIGHT LEG (HCC): ICD-10-CM

## 2024-09-25 LAB
BILIRUB UR QL STRIP.AUTO: NEGATIVE
CLARITY UR: CLEAR
COLOR UR: YELLOW
FOLATE SERPL-MCNC: 8.51 NG/ML (ref 4.78–24.2)
GLUCOSE UR STRIP.AUTO-MCNC: NEGATIVE MG/DL
HGB UR QL STRIP.AUTO: NEGATIVE
KETONES UR STRIP.AUTO-MCNC: NEGATIVE MG/DL
LEUKOCYTE ESTERASE UR QL STRIP.AUTO: NEGATIVE
NITRITE UR QL STRIP.AUTO: NEGATIVE
PH UR STRIP.AUTO: 6.5 [PH] (ref 5–8)
PROT UR STRIP.AUTO-MCNC: NEGATIVE MG/DL
SP GR UR STRIP.AUTO: 1.01 (ref 1–1.03)
UA COMPLETE W REFLEX CULTURE PNL UR: NORMAL
UA DIPSTICK W REFLEX MICRO PNL UR: NORMAL
URN SPEC COLLECT METH UR: NORMAL
UROBILINOGEN UR STRIP-ACNC: 0.2 E.U./DL
VIT B12 SERPL-MCNC: 342 PG/ML (ref 211–911)

## 2024-09-25 PROCEDURE — 99214 OFFICE O/P EST MOD 30 MIN: CPT | Performed by: INTERNAL MEDICINE

## 2024-09-25 PROCEDURE — 3017F COLORECTAL CA SCREEN DOC REV: CPT | Performed by: INTERNAL MEDICINE

## 2024-09-25 PROCEDURE — G8427 DOCREV CUR MEDS BY ELIG CLIN: HCPCS | Performed by: INTERNAL MEDICINE

## 2024-09-25 PROCEDURE — 4004F PT TOBACCO SCREEN RCVD TLK: CPT | Performed by: INTERNAL MEDICINE

## 2024-09-25 PROCEDURE — G8419 CALC BMI OUT NRM PARAM NOF/U: HCPCS | Performed by: INTERNAL MEDICINE

## 2024-09-26 LAB
IGA SERPL-MCNC: 319 MG/DL (ref 70–400)
TISSUE TRANSGLUTAMINASE IGA: <0.5 U/ML (ref 0–14)

## 2024-09-30 ENCOUNTER — TELEPHONE (OUTPATIENT)
Dept: CARDIOLOGY CLINIC | Age: 49
End: 2024-09-30

## 2024-09-30 NOTE — TELEPHONE ENCOUNTER
Test Ordered: CTA Abdominal Aorta  /  Insurance: [employer]  /  Authorization Status: Approved:  Nolberto# [id number], Exp 11/30/24

## 2024-10-07 ENCOUNTER — HOSPITAL ENCOUNTER (OUTPATIENT)
Dept: CT IMAGING | Age: 49
Discharge: HOME | End: 2024-10-07
Attending: INTERNAL MEDICINE
Payer: MEDICARE

## 2024-10-07 DIAGNOSIS — I73.9 PVD (PERIPHERAL VASCULAR DISEASE) (HCC): ICD-10-CM

## 2024-10-07 DIAGNOSIS — I70.211 ATHEROSCLEROSIS OF NATIVE ARTERIES OF EXTREMITIES WITH INTERMITTENT CLAUDICATION, RIGHT LEG (HCC): ICD-10-CM

## 2024-10-07 DIAGNOSIS — I73.9 CLAUDICATION (HCC): ICD-10-CM

## 2024-10-07 PROCEDURE — 6360000004 HC RX CONTRAST MEDICATION: Performed by: INTERNAL MEDICINE

## 2024-10-07 PROCEDURE — 75635 CT ANGIO ABDOMINAL ARTERIES: CPT

## 2024-10-07 RX ORDER — IOPAMIDOL 755 MG/ML
75 INJECTION, SOLUTION INTRAVASCULAR
Status: COMPLETED | OUTPATIENT
Start: 2024-10-07 | End: 2024-10-07

## 2024-10-07 RX ADMIN — IOPAMIDOL 75 ML: 755 INJECTION, SOLUTION INTRAVENOUS at 12:23

## 2024-10-15 ENCOUNTER — OFFICE VISIT (OUTPATIENT)
Dept: CARDIOLOGY CLINIC | Age: 49
End: 2024-10-15
Payer: MEDICARE

## 2024-10-15 VITALS — HEIGHT: 65 IN | OXYGEN SATURATION: 98 % | BODY MASS INDEX: 16.69 KG/M2 | WEIGHT: 100.2 LBS | HEART RATE: 76 BPM

## 2024-10-15 DIAGNOSIS — G62.9 NEUROPATHY: Primary | ICD-10-CM

## 2024-10-15 PROCEDURE — 99214 OFFICE O/P EST MOD 30 MIN: CPT | Performed by: INTERNAL MEDICINE

## 2024-10-15 PROCEDURE — 3017F COLORECTAL CA SCREEN DOC REV: CPT | Performed by: INTERNAL MEDICINE

## 2024-10-15 PROCEDURE — G8427 DOCREV CUR MEDS BY ELIG CLIN: HCPCS | Performed by: INTERNAL MEDICINE

## 2024-10-15 PROCEDURE — G8484 FLU IMMUNIZE NO ADMIN: HCPCS | Performed by: INTERNAL MEDICINE

## 2024-10-15 PROCEDURE — G8419 CALC BMI OUT NRM PARAM NOF/U: HCPCS | Performed by: INTERNAL MEDICINE

## 2024-10-15 PROCEDURE — 4004F PT TOBACCO SCREEN RCVD TLK: CPT | Performed by: INTERNAL MEDICINE

## 2024-10-15 NOTE — PROGRESS NOTES
07/09/2024 04:10 PM    CL 98 07/09/2024 04:10 PM    CO2 23 07/09/2024 04:10 PM    BUN 8 07/09/2024 04:10 PM     TSH:  No results found for: \"TSH\", \"TSHHS\"    48-year-old lady with history of alcohol abuse and chronic pancreatitis presents to hospital with abdominal pain. Patient was found to have acute on chronic pancreatitis. Patient was treated IV pain medications. Patient improved symptomatically. Patient was able to tolerate p.o. diet. Patient is being discharged in hemodynamically stable state    The remainder of the patient's chronic medical issues remained stable and appropriately treated with home regimens throughout this admission. On the date of discharge, the patient was found to not be in any acute distress, with vital signs within normal limits, and no new abnormalities on physical examination. Further, the patient expressed appropriate understanding of, and agreement with, the discharge recommendations, medications, and plan.     Assessment & Plan:    -Fluttering in the chest will also obtain a Holter monitor this might be alcohol related arrhythmias    - Abn EKG  Will check an echocardiogram for further clarification patient is history of EtOH abuse we will check her ejection fraction for cardiomyopathy    -Most complaining of leg discomfort as well however has good pulses bilaterally      -Patient has short runs of SVT resolving spontaneously longest 1 was for 5 beats at a normal echo  Patient's heart rate and blood pressure has been on the lower side hence we will hold off on the beta-blocker for now    - claudication check LILO   Right side findings: Resting LILO is 0.89. This is mildly decreased.    Left side findings: Resting LILO is 0.81. This is mildly decreased.    Ov to discuss   CTA for PVD      Also dis  IMPRESSION:  1. Calcific vascular disease of the abdominal aorta without significant stenosis  of the major branches of the abdominal aorta.  2. There is calcific vascular disease of the iliac

## 2025-04-28 ENCOUNTER — TELEPHONE (OUTPATIENT)
Dept: CARDIOLOGY CLINIC | Age: 50
End: 2025-04-28

## 2025-06-11 SDOH — HEALTH STABILITY: PHYSICAL HEALTH: ON AVERAGE, HOW MANY DAYS PER WEEK DO YOU ENGAGE IN MODERATE TO STRENUOUS EXERCISE (LIKE A BRISK WALK)?: 1 DAY

## 2025-06-11 SDOH — HEALTH STABILITY: PHYSICAL HEALTH: ON AVERAGE, HOW MANY MINUTES DO YOU ENGAGE IN EXERCISE AT THIS LEVEL?: 10 MIN

## 2025-06-11 ASSESSMENT — PATIENT HEALTH QUESTIONNAIRE - PHQ9
SUM OF ALL RESPONSES TO PHQ QUESTIONS 1-9: 2
2. FEELING DOWN, DEPRESSED OR HOPELESS: SEVERAL DAYS
SUM OF ALL RESPONSES TO PHQ QUESTIONS 1-9: 2
1. LITTLE INTEREST OR PLEASURE IN DOING THINGS: SEVERAL DAYS

## 2025-06-11 ASSESSMENT — LIFESTYLE VARIABLES
HOW MANY STANDARD DRINKS CONTAINING ALCOHOL DO YOU HAVE ON A TYPICAL DAY: 0
HOW OFTEN DO YOU HAVE SIX OR MORE DRINKS ON ONE OCCASION: 1
HOW OFTEN DO YOU HAVE A DRINK CONTAINING ALCOHOL: NEVER
HOW OFTEN DO YOU HAVE A DRINK CONTAINING ALCOHOL: 1
HOW MANY STANDARD DRINKS CONTAINING ALCOHOL DO YOU HAVE ON A TYPICAL DAY: PATIENT DOES NOT DRINK

## 2025-06-15 SDOH — ECONOMIC STABILITY: FOOD INSECURITY: WITHIN THE PAST 12 MONTHS, YOU WORRIED THAT YOUR FOOD WOULD RUN OUT BEFORE YOU GOT MONEY TO BUY MORE.: NEVER TRUE

## 2025-06-15 SDOH — ECONOMIC STABILITY: FOOD INSECURITY: WITHIN THE PAST 12 MONTHS, THE FOOD YOU BOUGHT JUST DIDN'T LAST AND YOU DIDN'T HAVE MONEY TO GET MORE.: NEVER TRUE

## 2025-06-15 SDOH — ECONOMIC STABILITY: INCOME INSECURITY: IN THE LAST 12 MONTHS, WAS THERE A TIME WHEN YOU WERE NOT ABLE TO PAY THE MORTGAGE OR RENT ON TIME?: NO

## 2025-06-15 SDOH — ECONOMIC STABILITY: TRANSPORTATION INSECURITY
IN THE PAST 12 MONTHS, HAS LACK OF TRANSPORTATION KEPT YOU FROM MEETINGS, WORK, OR FROM GETTING THINGS NEEDED FOR DAILY LIVING?: NO

## 2025-06-15 SDOH — ECONOMIC STABILITY: TRANSPORTATION INSECURITY
IN THE PAST 12 MONTHS, HAS THE LACK OF TRANSPORTATION KEPT YOU FROM MEDICAL APPOINTMENTS OR FROM GETTING MEDICATIONS?: NO

## 2025-06-15 ASSESSMENT — RHEUMATOLOGY NEW PATIENT QUESTIONNAIRE
HEARTBURN OR REFLUX: Y
VOMITING OF BLOOD OR COFFEE GROUND CONSISTENCY MATERIAL: N
FEVER: N
EASILY LOSING TEMPER: N
UNUSUAL FATIGUE: Y
LIST JOINTS AFFECTED BY SWELLING IN THE PAST MONTH: LEGS FEET
CHEST PAIN: N
UNUSUALLY RAPID OR SLOWED HEART RATE: N
UNUSUAL BLEEDING: N
SKIN TIGHTNESS: N
MORNING STIFFNESS IN LOWER BACK: Y
BEHAVIORAL CHANGES: N
INCREASED SUSCEPTIBILITY TO INFECTION: N
DRYNESS OF MOUTH: N
ANXIETY: Y
MORNING STIFFNESS: Y
NIGHT SWEATS: N
LOSS OF CONSCIOUSNESS: N
SHORTNESS OF BREATH: N
SWOLLEN OR TENDER GLANDS: N
STOMACH PAIN: Y
AGITATION: Y
JOINT SWELLING: Y
COUGH: Y
HOARSE VOICE: N
PAIN OR BURNING ON URINATION: N
HOW WOULD YOU DESCRIBE YOUR STIFFNESS ON AVERAGE: MODERATE
RASH OR ULCERS: N
DIFFICULTY BREATHING LYING DOWN: N
EYE REDNESS: N
EYE DRYNESS: N
COUGHING OF BLOOD: N
ABNORMAL URINE: N
COLOR CHANGES OF HANDS OR FEET IN THE COLD: N
BLACK STOOLS: N
NAUSEA: Y
MUSCLE WEAKNESS: Y
SORES IN MOUTH OR NOSE: N
EASY BRUISING: N
HEADACHES: Y
DEPRESSION: N
EXCESSIVE HAIR LOSS (MORE THAN YOUR NORM): N
NUMBNESS OR TINGLING IN HANDS OR FEET: Y
MEMORY LOSS: Y
JAUNDICE: N
WHEEZING: Y
NODULES/BUMPS: N
EYE PAIN: N
SEIZURES: N
SUN SENSITIVE (SUN ALLERGY): N
RASH: N
BLOOD IN STOOLS: N
UNEXPLAINED HEARING LOSS: N
UNEXPLAINED WEIGHT CHANGE: N
SKIN REDNESS: N
SWOLLEN LEGS OR FEET: Y
VAGINAL DRYNESS: N
DOUBLE OR BLURRED VISION: N
DIFFICULTY STAYING ASLEEP: N
ANEMIA: N
DIFFICULTY SWALLOWING: Y
PERSISTENT DIARRHEA: N
DIFFICULTY FALLING ASLEEP: N
FAINTING: N
JOINT PAIN: Y
LOSS OF VISION: N

## 2025-06-17 ENCOUNTER — OFFICE VISIT (OUTPATIENT)
Dept: FAMILY MEDICINE CLINIC | Age: 50
End: 2025-06-17

## 2025-06-17 VITALS
SYSTOLIC BLOOD PRESSURE: 98 MMHG | OXYGEN SATURATION: 97 % | TEMPERATURE: 97.8 F | HEIGHT: 65 IN | DIASTOLIC BLOOD PRESSURE: 60 MMHG | BODY MASS INDEX: 19.26 KG/M2 | HEART RATE: 95 BPM | WEIGHT: 115.6 LBS

## 2025-06-17 DIAGNOSIS — Z00.00 INITIAL MEDICARE ANNUAL WELLNESS VISIT: Primary | ICD-10-CM

## 2025-06-17 DIAGNOSIS — Z01.419 VISIT FOR GYNECOLOGIC EXAMINATION: ICD-10-CM

## 2025-06-17 DIAGNOSIS — F17.200 SMOKER: ICD-10-CM

## 2025-06-17 DIAGNOSIS — K86.0 ALCOHOL-INDUCED CHRONIC PANCREATITIS (HCC): ICD-10-CM

## 2025-06-17 DIAGNOSIS — M79.672 PAIN IN BOTH FEET: ICD-10-CM

## 2025-06-17 DIAGNOSIS — M79.671 PAIN IN BOTH FEET: ICD-10-CM

## 2025-06-17 DIAGNOSIS — S92.911S CLOSED NONDISPLACED FRACTURE OF PHALANX OF TOE OF RIGHT FOOT, UNSPECIFIED TOE, SEQUELA: ICD-10-CM

## 2025-06-17 PROBLEM — S92.911A CLOSED NONDISPLACED FRACTURE OF PHALANX OF TOE OF RIGHT FOOT: Status: ACTIVE | Noted: 2025-06-17

## 2025-06-17 RX ORDER — TIZANIDINE 2 MG/1
2 TABLET ORAL EVERY 8 HOURS PRN
Qty: 270 TABLET | Refills: 1 | Status: SHIPPED | OUTPATIENT
Start: 2025-06-17

## 2025-06-17 RX ORDER — PROMETHAZINE HYDROCHLORIDE 25 MG/1
25 TABLET ORAL EVERY 6 HOURS PRN
Qty: 30 TABLET | Refills: 4 | Status: SHIPPED | OUTPATIENT
Start: 2025-06-17

## 2025-06-17 RX ORDER — VARENICLINE TARTRATE 0.5 MG/1
.5-1 TABLET, FILM COATED ORAL SEE ADMIN INSTRUCTIONS
Qty: 57 TABLET | Refills: 0 | Status: SHIPPED | OUTPATIENT
Start: 2025-06-17

## 2025-06-17 RX ORDER — VARENICLINE TARTRATE 1 MG/1
1 TABLET, FILM COATED ORAL 2 TIMES DAILY
Qty: 60 TABLET | Refills: 2 | Status: SHIPPED | OUTPATIENT
Start: 2025-07-15

## 2025-06-17 RX ORDER — HYDROCODONE BITARTRATE AND ACETAMINOPHEN 5; 325 MG/1; MG/1
1 TABLET ORAL EVERY 6 HOURS PRN
Qty: 25 TABLET | Refills: 0 | Status: CANCELLED | OUTPATIENT
Start: 2025-06-17 | End: 2025-07-02

## 2025-06-17 RX ORDER — OXYCODONE AND ACETAMINOPHEN 5; 325 MG/1; MG/1
1 TABLET ORAL EVERY 6 HOURS PRN
Qty: 20 TABLET | Refills: 0 | Status: SHIPPED | OUTPATIENT
Start: 2025-06-17 | End: 2025-06-24

## 2025-06-17 RX ORDER — ONDANSETRON 8 MG/1
8 TABLET, ORALLY DISINTEGRATING ORAL 3 TIMES DAILY PRN
Qty: 30 TABLET | Refills: 4 | Status: SHIPPED | OUTPATIENT
Start: 2025-06-17

## 2025-06-17 RX ORDER — TRAMADOL HYDROCHLORIDE 50 MG/1
50 TABLET ORAL EVERY 8 HOURS PRN
Qty: 90 TABLET | Refills: 0 | Status: SHIPPED | OUTPATIENT
Start: 2025-06-17 | End: 2025-07-17

## 2025-06-17 ASSESSMENT — VISUAL ACUITY
OD_CC: 20/25
OS_CC: 20/25

## 2025-06-17 NOTE — PROGRESS NOTES
0.5mg TWICE DAILY for 4 days followed by 1mg TWICE DAILY, Disp-57 tablet, R-0Normal  -     varenicline (CHANTIX) 1 MG tablet; Take 1 tablet by mouth 2 times daily, Disp-60 tablet, R-2Normal  Alcohol-induced chronic pancreatitis (HCC)  -     ondansetron (ZOFRAN-ODT) 8 MG TBDP disintegrating tablet; Take 1 tablet by mouth 3 times daily as needed for Nausea, Disp-30 tablet, R-4Can reduce to smaller quantitiy if needed for insurance.Normal  -     promethazine (PHENERGAN) 25 MG tablet; Take 1 tablet by mouth every 6 hours as needed for Nausea, Disp-30 tablet, R-4Normal  -     tiZANidine (ZANAFLEX) 2 MG tablet; Take 1 tablet by mouth every 8 hours as needed (stomach and back pain.), Disp-270 tablet, R-1Normal  -     traMADol (ULTRAM) 50 MG tablet; Take 1 tablet by mouth every 8 hours as needed for Pain for up to 30 days. Intended supply: 30+ days. Take lowest dose possible to manage pain Max Daily Amount: 150 mg, Disp-90 tablet, R-0Normal  -     oxyCODONE-acetaminophen (ENDOCET) 5-325 MG per tablet; Take 1 tablet by mouth every 6 hours as needed for Pain for up to 7 days. Intended supply: 3 days. Take lowest dose possible to manage pain Max Daily Amount: 4 tablets, Disp-20 tablet, R-0Normal  Closed nondisplaced fracture of phalanx of toe of right foot, unspecified toe, sequela  -     oxyCODONE-acetaminophen (ENDOCET) 5-325 MG per tablet; Take 1 tablet by mouth every 6 hours as needed for Pain for up to 7 days. Intended supply: 3 days. Take lowest dose possible to manage pain Max Daily Amount: 4 tablets, Disp-20 tablet, R-0Normal  -     XR FOOT RIGHT (MIN 3 VIEWS); Future  -     XR TOE RIGHT (MIN 2 VIEWS); Future  Pain in feet and lower legs.  - Experiences tingling and pain in feet and lower legs.  - Cardiologist ruled out vascular causes and suggested a neurological evaluation. B12 and folic acid levels were normal as of 09/2024.  - Referral to a neurologist will be made for further evaluation.   Routine GYN care.  -

## 2025-06-17 NOTE — PROGRESS NOTES
good ROM,   Ankle:good ROM,   FEET: neg forefoot squeeze test    Spine:  Normal range of motion; no tender points, no obvious deformities.    Neuro:  Alert & oriented x 3, normal motor function, normal sensory function, no focal deficits noted .  Muscle strength: 4/4 in bilateral upper and lower extremities.    Psychiatric: Mood and affect are appropriate, recent and remote memory normal,      LABS AND IMAGING    No visits with results within 6 Month(s) from this visit.   Latest known visit with results is:   Office Visit on 09/24/2024   Component Date Value Ref Range Status    Color, UA 09/24/2024 Yellow  Straw/Yellow Final    Clarity, UA 09/24/2024 Clear  Clear Final    Glucose, Ur 09/24/2024 Negative  Negative mg/dL Final    Bilirubin, Urine 09/24/2024 Negative  Negative Final    Ketones, Urine 09/24/2024 Negative  Negative mg/dL Final    Specific Gravity, UA 09/24/2024 1.006  1.005 - 1.030 Final    Blood, Urine 09/24/2024 Negative  Negative Final    pH, Urine 09/24/2024 6.5  5.0 - 8.0 Final    Protein, UA 09/24/2024 Negative  Negative mg/dL Final    Urobilinogen, Urine 09/24/2024 0.2  <2.0 E.U./dL Final    Nitrite, Urine 09/24/2024 Negative  Negative Final    Leukocyte Esterase, Urine 09/24/2024 Negative  Negative Final    Microscopic Examination 09/24/2024 Not Indicated   Final    Urine Type 09/24/2024 Cleancatch   Final    Urine Reflex to Culture 09/24/2024 Not Indicated   Final    IgA 09/24/2024 319.0  70.0 - 400.0 mg/dL Final    Vitamin B-12 09/24/2024 342  211 - 911 pg/mL Final    Folate 09/24/2024 8.51  4.78 - 24.20 ng/mL Final    Tissue Transglutaminase IgA 09/24/2024 <0.5  0.0 - 14.0 U/mL Final     Bone density on 8.23.25     IMPRESSION:  Bone mineral density is below the expected range for age (Z-score less than  or equal to -2.0).      Assessment   Patient is a pleasant 49-year-old female, who is a good historian presenting with osteopenia likely secondary to chronic pancreatitis in the background of

## 2025-06-17 NOTE — PATIENT INSTRUCTIONS
high-salt, high-fat, processed foods.     Read food labels and try to avoid saturated and trans fats. They increase your risk of heart disease by raising cholesterol levels.     Limit the amount of solid fat--butter, margarine, and shortening--you eat. Use olive, peanut, or canola oil when you cook. Bake, broil, and steam foods instead of frying them.     Eat a variety of fruit and vegetables every day. Dark green, deep orange, red, or yellow fruits and vegetables are especially good for you. Examples include spinach, carrots, peaches, and berries.     Foods high in fiber can reduce your cholesterol and provide important vitamins and minerals. High-fiber foods include whole-grain cereals and breads, oatmeal, beans, brown rice, citrus fruits, and apples.     Eat lean proteins. Heart-healthy proteins include seafood, lean meats and poultry, eggs, beans, peas, nuts, seeds, and soy products.     Limit drinks and foods with added sugar. These include candy, desserts, and soda pop.   Heart-healthy lifestyle    If your doctor recommends it, get more exercise. For many people, walking is a good choice. Or you may want to swim, bike, or do other activities. Bit by bit, increase the time you're active every day. Try for at least 30 minutes on most days of the week.     Try to quit or cut back on using tobacco and other nicotine products. This includes smoking and vaping. If you need help quitting, talk to your doctor about stop-smoking programs and medicines. These can increase your chances of quitting for good. Quitting is one of the most important things you can do to protect your heart. It is never too late to quit. Try to avoid secondhand smoke too.     Stay at a weight that's healthy for you. Talk to your doctor if you need help losing weight.     Try to get 7 to 9 hours of sleep each night.     Limit alcohol to 2 drinks a day for men and 1 drink a day for women. Too much alcohol can cause health problems.     Manage

## 2025-06-18 ENCOUNTER — HOSPITAL ENCOUNTER (OUTPATIENT)
Age: 50
Discharge: HOME | End: 2025-06-18
Payer: MEDICARE

## 2025-06-18 ENCOUNTER — OFFICE VISIT (OUTPATIENT)
Age: 50
End: 2025-06-18
Payer: MEDICARE

## 2025-06-18 ENCOUNTER — HOSPITAL ENCOUNTER (OUTPATIENT)
Dept: GENERAL RADIOLOGY | Age: 50
Discharge: HOME | End: 2025-06-18
Payer: MEDICARE

## 2025-06-18 VITALS
HEART RATE: 78 BPM | DIASTOLIC BLOOD PRESSURE: 68 MMHG | WEIGHT: 116 LBS | BODY MASS INDEX: 19.16 KG/M2 | SYSTOLIC BLOOD PRESSURE: 120 MMHG

## 2025-06-18 DIAGNOSIS — M85.89 OSTEOPENIA OF MULTIPLE SITES: Primary | ICD-10-CM

## 2025-06-18 DIAGNOSIS — Z01.89 ENCOUNTER FOR OTHER SPECIFIED SPECIAL EXAMINATIONS: ICD-10-CM

## 2025-06-18 DIAGNOSIS — M85.89 OSTEOPENIA OF MULTIPLE SITES: ICD-10-CM

## 2025-06-18 DIAGNOSIS — S92.911S CLOSED NONDISPLACED FRACTURE OF PHALANX OF TOE OF RIGHT FOOT, UNSPECIFIED TOE, SEQUELA: ICD-10-CM

## 2025-06-18 LAB
25(OH)D3 SERPL-MCNC: 22.6 NG/ML (ref 30–150)
ALBUMIN SERPL-MCNC: 4.2 G/DL (ref 3.4–5)
ALBUMIN/GLOB SERPL: 1.5 {RATIO} (ref 1.1–2.2)
ALP SERPL-CCNC: 112 U/L (ref 40–129)
ALT SERPL-CCNC: 13 U/L (ref 10–40)
AST SERPL-CCNC: 20 U/L (ref 15–37)
BILIRUB DIRECT SERPL-MCNC: <0.2 MG/DL (ref 0–0.3)
BILIRUB INDIRECT SERPL-MCNC: NORMAL MG/DL (ref 0–0.7)
BILIRUB SERPL-MCNC: 0.3 MG/DL (ref 0–1)
CALCIUM SERPL-MCNC: 9.8 MG/DL (ref 8.3–10.6)
CREAT SERPL-MCNC: 0.7 MG/DL (ref 0.6–1.1)
ERYTHROCYTE [DISTWIDTH] IN BLOOD BY AUTOMATED COUNT: 13.9 % (ref 11.7–14.9)
GFR, ESTIMATED: >90 ML/MIN/1.73M2
HAV IGM SERPL QL IA: NONREACTIVE
HBV CORE IGM SERPL QL IA: NONREACTIVE
HBV SURFACE AG SERPL QL IA: NONREACTIVE
HCT VFR BLD AUTO: 42.2 % (ref 37–47)
HCV AB SERPL QL IA: NONREACTIVE
HGB BLD-MCNC: 13.5 G/DL (ref 12.5–16)
MCH RBC QN AUTO: 31.6 PG (ref 27–31)
MCHC RBC AUTO-ENTMCNC: 32 G/DL (ref 32–36)
MCV RBC AUTO: 98.8 FL (ref 78–100)
PLATELET # BLD AUTO: 285 K/UL (ref 140–440)
PMV BLD AUTO: 12.5 FL (ref 7.5–11.1)
PROT SERPL-MCNC: 7 G/DL (ref 6.4–8.2)
RBC # BLD AUTO: 4.27 M/UL (ref 4.2–5.4)
URATE SERPL-MCNC: 4.4 MG/DL (ref 2.6–6)
WBC OTHER # BLD: 10.4 K/UL (ref 4–10.5)

## 2025-06-18 PROCEDURE — 84550 ASSAY OF BLOOD/URIC ACID: CPT

## 2025-06-18 PROCEDURE — G8427 DOCREV CUR MEDS BY ELIG CLIN: HCPCS | Performed by: STUDENT IN AN ORGANIZED HEALTH CARE EDUCATION/TRAINING PROGRAM

## 2025-06-18 PROCEDURE — 4004F PT TOBACCO SCREEN RCVD TLK: CPT | Performed by: STUDENT IN AN ORGANIZED HEALTH CARE EDUCATION/TRAINING PROGRAM

## 2025-06-18 PROCEDURE — 85027 COMPLETE CBC AUTOMATED: CPT

## 2025-06-18 PROCEDURE — G8420 CALC BMI NORM PARAMETERS: HCPCS | Performed by: STUDENT IN AN ORGANIZED HEALTH CARE EDUCATION/TRAINING PROGRAM

## 2025-06-18 PROCEDURE — 80074 ACUTE HEPATITIS PANEL: CPT

## 2025-06-18 PROCEDURE — 86200 CCP ANTIBODY: CPT

## 2025-06-18 PROCEDURE — 36415 COLL VENOUS BLD VENIPUNCTURE: CPT

## 2025-06-18 PROCEDURE — 80076 HEPATIC FUNCTION PANEL: CPT

## 2025-06-18 PROCEDURE — 82306 VITAMIN D 25 HYDROXY: CPT

## 2025-06-18 PROCEDURE — 82565 ASSAY OF CREATININE: CPT

## 2025-06-18 PROCEDURE — 86431 RHEUMATOID FACTOR QUANT: CPT

## 2025-06-18 PROCEDURE — 73660 X-RAY EXAM OF TOE(S): CPT

## 2025-06-18 PROCEDURE — 82310 ASSAY OF CALCIUM: CPT

## 2025-06-18 PROCEDURE — 99205 OFFICE O/P NEW HI 60 MIN: CPT | Performed by: STUDENT IN AN ORGANIZED HEALTH CARE EDUCATION/TRAINING PROGRAM

## 2025-06-18 PROCEDURE — 3017F COLORECTAL CA SCREEN DOC REV: CPT | Performed by: STUDENT IN AN ORGANIZED HEALTH CARE EDUCATION/TRAINING PROGRAM

## 2025-06-18 NOTE — PATIENT INSTRUCTIONS
Patient Instructions  Complete ordered labs and get imaging done  I plan to order vitamin D after I review your labs  I recommend exercise and healthy diet   We will update you on your lab results   RTC in 10 weeks   I commend you for deciding on quitting smoking  We will repeat DEXA scan at your next visit

## 2025-06-19 LAB — RHEUMATOID FACTOR: <10 IU/ML

## 2025-06-20 ENCOUNTER — RESULTS FOLLOW-UP (OUTPATIENT)
Dept: FAMILY MEDICINE CLINIC | Age: 50
End: 2025-06-20

## 2025-06-20 ENCOUNTER — HOSPITAL ENCOUNTER (OUTPATIENT)
Dept: GENERAL RADIOLOGY | Age: 50
Discharge: HOME | End: 2025-06-20
Payer: MEDICARE

## 2025-06-20 DIAGNOSIS — S92.901D CLOSED FRACTURE OF RIGHT FOOT WITH ROUTINE HEALING, SUBSEQUENT ENCOUNTER: Primary | ICD-10-CM

## 2025-06-20 LAB
CYCLIC CITRULLIN PEPTIDE AB: 3 UNITS (ref 0–19)
QUANTI TB1 MINUS NIL: NORMAL IU/ML
QUANTI TB2 MINUS NIL: NORMAL IU/ML
QUANTIFERON MITOGEN MINUS NIL: 9.9 IU/ML
QUANTIFERON NIL: 0.1 IU/ML
QUANTIFERON TB INTERPRETATION: NEGATIVE IU/ML

## 2025-06-20 PROCEDURE — 73630 X-RAY EXAM OF FOOT: CPT

## 2025-06-23 ENCOUNTER — RESULTS FOLLOW-UP (OUTPATIENT)
Age: 50
End: 2025-06-23

## 2025-06-23 ENCOUNTER — OFFICE VISIT (OUTPATIENT)
Dept: ORTHOPEDIC SURGERY | Age: 50
End: 2025-06-23
Payer: MEDICARE

## 2025-06-23 VITALS — WEIGHT: 115 LBS | HEART RATE: 65 BPM | BODY MASS INDEX: 18.99 KG/M2 | OXYGEN SATURATION: 98 %

## 2025-06-23 DIAGNOSIS — S92.344A CLOSED NONDISPLACED FRACTURE OF FOURTH METATARSAL BONE OF RIGHT FOOT, INITIAL ENCOUNTER: ICD-10-CM

## 2025-06-23 DIAGNOSIS — G63 NEUROPATHY DUE TO MEDICAL CONDITION: ICD-10-CM

## 2025-06-23 DIAGNOSIS — G62.9 NEUROPATHY: Primary | ICD-10-CM

## 2025-06-23 DIAGNOSIS — E55.9 VITAMIN D DEFICIENCY: Primary | ICD-10-CM

## 2025-06-23 DIAGNOSIS — S92.334A CLOSED NONDISPLACED FRACTURE OF THIRD METATARSAL BONE OF RIGHT FOOT, INITIAL ENCOUNTER: ICD-10-CM

## 2025-06-23 DIAGNOSIS — G57.91 NEUROPATHY OF RIGHT FOOT: ICD-10-CM

## 2025-06-23 DIAGNOSIS — M77.41 METATARSALGIA OF RIGHT FOOT: ICD-10-CM

## 2025-06-23 PROCEDURE — G8427 DOCREV CUR MEDS BY ELIG CLIN: HCPCS | Performed by: PHYSICIAN ASSISTANT

## 2025-06-23 PROCEDURE — 3017F COLORECTAL CA SCREEN DOC REV: CPT | Performed by: PHYSICIAN ASSISTANT

## 2025-06-23 PROCEDURE — G8420 CALC BMI NORM PARAMETERS: HCPCS | Performed by: PHYSICIAN ASSISTANT

## 2025-06-23 PROCEDURE — 4004F PT TOBACCO SCREEN RCVD TLK: CPT | Performed by: PHYSICIAN ASSISTANT

## 2025-06-23 PROCEDURE — 99202 OFFICE O/P NEW SF 15 MIN: CPT | Performed by: PHYSICIAN ASSISTANT

## 2025-06-23 RX ORDER — ERGOCALCIFEROL 1.25 MG/1
50000 CAPSULE, LIQUID FILLED ORAL WEEKLY
Qty: 12 CAPSULE | Refills: 0 | Status: SHIPPED | OUTPATIENT
Start: 2025-06-23

## 2025-06-23 SDOH — HEALTH STABILITY: PHYSICAL HEALTH: ON AVERAGE, HOW MANY DAYS PER WEEK DO YOU ENGAGE IN MODERATE TO STRENUOUS EXERCISE (LIKE A BRISK WALK)?: 3 DAYS

## 2025-06-23 SDOH — HEALTH STABILITY: PHYSICAL HEALTH: ON AVERAGE, HOW MANY MINUTES DO YOU ENGAGE IN EXERCISE AT THIS LEVEL?: 30 MIN

## 2025-06-23 NOTE — PROGRESS NOTES
Injured right foot in March. Has been traveling, previous urgent care and podiatry advised her to continue weightbearing and fractures were minimally concerning.     Subacute minimally angulated fractures involving the third and fourth   metatarsal necks and additional subacute transverse fracture involving the base of the fifth proximal phalanx.     Today she has 3/10 pain which is persistent. No new injuries. Notes numbness and tingling in foot, but this is bilateral and pre-existing.

## 2025-06-23 NOTE — PROGRESS NOTES
ORTHOPEDIC SURGERY OFFICE NOTE  CHIEF COMPLAINT:  Chief Complaint   Patient presents with    Foot Injury     Right foot     HISTORY OF PRESENT ILLNESS:  Shante Rubi is a 49 y.o. female Injured right foot in March. Has been traveling, previous urgent care and podiatry advised her to continue weightbearing and fractures were minimally concerning.     Subacute minimally angulated fractures involving the third and fourth   metatarsal necks and additional subacute transverse fracture involving the base of the fifth proximal phalanx.     Today she has 3/10 pain which is persistent. No new injuries. Notes numbness and tingling in foot, but this is bilateral and pre-existing.     (HPI) 49-year-old female presenting to office today prompted by primary care for continued right foot pain and paresthesias/numbness tingling into the right foot, and pain to the plantar aspect of the metatarsals.  Patient sustained an injury back in March, she is approximately 8 weeks from the injury.  At that time had multiple metatarsal fractures of the right foot that were nondisplaced and angulated and a proximal fifth phalanx fracture.  Had been in a boot for 6 weeks.  Had followed up with primary care last week still complaining of persisting foot pain.  X-ray imaging was obtained.  This demonstrated subacute appropriately healing fractures of the 3rd and 4th metatarsals and the proximal phalanx fx.  No significant ankle pain.  Does admit to occasional numbness and tingling into the foot area.  Patient also recently referred to rheumatology for significant osteoporosis secondary to her history of alcohol use/chronic pancreatitis.  At that time they also did mention that patient will likely need neurology follow-up and possible EMGs obtained.    PAST MEDICAL HISTORY:  Past Medical History:   Diagnosis Date    Anxiety January 2024    Pancreatitis, Alcohol-induced chronic pancreatitis (HCC) 02/23/2019    Sarcopenia 07/20/2022     PAST

## 2025-06-23 NOTE — PATIENT INSTRUCTIONS
Order for EMG placed today. To be completed by Neurology.   Referral to Dr. Mtz to discuss Orthotics.  Complete Rheumatology workup.

## 2025-07-03 ENCOUNTER — HOSPITAL ENCOUNTER (OUTPATIENT)
Age: 50
Setting detail: SPECIMEN
Discharge: HOME | End: 2025-07-03

## 2025-07-03 ENCOUNTER — OFFICE VISIT (OUTPATIENT)
Dept: OBGYN | Age: 50
End: 2025-07-03

## 2025-07-03 VITALS
SYSTOLIC BLOOD PRESSURE: 95 MMHG | WEIGHT: 108 LBS | HEART RATE: 84 BPM | BODY MASS INDEX: 17.99 KG/M2 | HEIGHT: 65 IN | DIASTOLIC BLOOD PRESSURE: 74 MMHG

## 2025-07-03 DIAGNOSIS — Z01.419 ENCOUNTER FOR ANNUAL ROUTINE GYNECOLOGICAL EXAMINATION: Primary | ICD-10-CM

## 2025-07-03 DIAGNOSIS — R68.82 DECREASED LIBIDO: ICD-10-CM

## 2025-07-03 DIAGNOSIS — N63.21 MASS OF UPPER OUTER QUADRANT OF LEFT BREAST: ICD-10-CM

## 2025-07-03 DIAGNOSIS — F17.200 CURRENT EVERY DAY SMOKER: ICD-10-CM

## 2025-07-03 DIAGNOSIS — N95.1 MENOPAUSAL SYMPTOMS: ICD-10-CM

## 2025-07-03 ASSESSMENT — ENCOUNTER SYMPTOMS
SHORTNESS OF BREATH: 0
ABDOMINAL PAIN: 0
CONSTIPATION: 0
NAUSEA: 0
VOMITING: 0
CHEST TIGHTNESS: 0
RESPIRATORY NEGATIVE: 1
GASTROINTESTINAL NEGATIVE: 1
DIARRHEA: 0

## 2025-07-03 NOTE — PROGRESS NOTES
7/3/25    Shante RODRIGUEZ Rubi  1975    Chief Complaint   Patient presents with    Consultation     Patient presents today as consult for annual exam    Annual Exam     Annual exam. Smoker . No known h/o dvt. Patient is postmenopausal is not on hrt . Patient is not currently sexually active. Pap Smear was  25 years per pt. Patient has had a recent mammogram 2024 which was negative for malignancy. Patient had bone density scan in  revealing low bone density for age Colonoscopy 2 years per pt normal .  Patient has no complaints .          The patient is a 49 y.o. female,  who presents for her annual exam.  She is menopausal.  She is not taking HRT. She is sexually active.    She reports decreased libido. She does have other menopausal symptoms including hot flashes, but libido is most bothersome to her. She has never been on HRT or any medication for menopausal symptoms.     Reports lump in L breast for 2 months. Denies any tenderness, overlying skin changes, nipple discharge.     Pap smear history: 25 years ago. Denies history of abnormal.     Breast history: her most recent mammogram was in 2024.  The results were: Normal    Osteoporosis Status: her bone density scan was in .  The results were osteopenia - treatment: calcium supplement, vitamin D.    Colonoscopy Status: she had a colonoscopy in .  The results were normal, next colonoscopy recommended .    Past Medical History:   Diagnosis Date    Anxiety 2024    Low back pain     Pancreatitis, Alcohol-induced chronic pancreatitis (HCC) 2019    Sarcopenia 2022       No past surgical history on file.    Family History   Problem Relation Age of Onset    Cancer Father         thyroid    Emphysema Mother     Cancer Mother     Cervical Cancer Mother        Social History     Tobacco Use    Smoking status: Every Day     Current packs/day: 0.50     Average packs/day: 0.5 packs/day for 30.0 years (15.0 ttl pk-yrs)

## 2025-07-08 LAB
COMMENT: NORMAL
HPV OTHER HR TYPES: NOT DETECTED
HPV TYPE 16: NOT DETECTED
HPV TYPE 18: NOT DETECTED

## 2025-07-15 LAB — GYNECOLOGY CYTOLOGY REPORT: NORMAL

## 2025-08-29 ENCOUNTER — HOSPITAL ENCOUNTER (OUTPATIENT)
Dept: WOMENS IMAGING | Age: 50
Discharge: HOME | End: 2025-08-29
Payer: MEDICARE

## 2025-08-29 ENCOUNTER — HOSPITAL ENCOUNTER (OUTPATIENT)
Dept: ULTRASOUND IMAGING | Age: 50
Discharge: HOME | End: 2025-08-29
Payer: MEDICARE

## 2025-08-29 DIAGNOSIS — N63.21 MASS OF UPPER OUTER QUADRANT OF LEFT BREAST: ICD-10-CM

## 2025-08-29 PROCEDURE — 76642 ULTRASOUND BREAST LIMITED: CPT

## 2025-08-29 PROCEDURE — G0279 TOMOSYNTHESIS, MAMMO: HCPCS

## 2025-09-02 ENCOUNTER — HOSPITAL ENCOUNTER (OUTPATIENT)
Dept: ULTRASOUND IMAGING | Age: 50
Discharge: HOME | End: 2025-09-02
Payer: MEDICARE

## 2025-09-02 ENCOUNTER — TELEPHONE (OUTPATIENT)
Dept: SURGERY | Age: 50
End: 2025-09-02

## 2025-09-02 ENCOUNTER — HOSPITAL ENCOUNTER (OUTPATIENT)
Dept: WOMENS IMAGING | Age: 50
Discharge: HOME | End: 2025-09-02
Payer: MEDICARE

## 2025-09-02 DIAGNOSIS — N64.59 ABNORMAL BREAST FINDING: ICD-10-CM

## 2025-09-02 PROCEDURE — 19083 BX BREAST 1ST LESION US IMAG: CPT

## 2025-09-02 PROCEDURE — 19084 BX BREAST ADD LESION US IMAG: CPT

## 2025-09-02 PROCEDURE — 77065 DX MAMMO INCL CAD UNI: CPT

## 2025-09-03 ENCOUNTER — OFFICE VISIT (OUTPATIENT)
Age: 50
End: 2025-09-03
Payer: MEDICARE

## 2025-09-03 VITALS
HEART RATE: 65 BPM | SYSTOLIC BLOOD PRESSURE: 108 MMHG | WEIGHT: 111 LBS | DIASTOLIC BLOOD PRESSURE: 60 MMHG | BODY MASS INDEX: 18.33 KG/M2

## 2025-09-03 DIAGNOSIS — E55.9 VITAMIN D DEFICIENCY: ICD-10-CM

## 2025-09-03 DIAGNOSIS — M85.89 OSTEOPENIA OF MULTIPLE SITES: Primary | ICD-10-CM

## 2025-09-03 PROCEDURE — G8419 CALC BMI OUT NRM PARAM NOF/U: HCPCS | Performed by: STUDENT IN AN ORGANIZED HEALTH CARE EDUCATION/TRAINING PROGRAM

## 2025-09-03 PROCEDURE — 3017F COLORECTAL CA SCREEN DOC REV: CPT | Performed by: STUDENT IN AN ORGANIZED HEALTH CARE EDUCATION/TRAINING PROGRAM

## 2025-09-03 PROCEDURE — 4004F PT TOBACCO SCREEN RCVD TLK: CPT | Performed by: STUDENT IN AN ORGANIZED HEALTH CARE EDUCATION/TRAINING PROGRAM

## 2025-09-03 PROCEDURE — 99214 OFFICE O/P EST MOD 30 MIN: CPT | Performed by: STUDENT IN AN ORGANIZED HEALTH CARE EDUCATION/TRAINING PROGRAM

## 2025-09-03 PROCEDURE — G8427 DOCREV CUR MEDS BY ELIG CLIN: HCPCS | Performed by: STUDENT IN AN ORGANIZED HEALTH CARE EDUCATION/TRAINING PROGRAM

## 2025-09-04 ENCOUNTER — TELEPHONE (OUTPATIENT)
Age: 50
End: 2025-09-04

## 2025-09-04 LAB — SURGICAL PATHOLOGY REPORT: NORMAL

## 2025-09-05 ENCOUNTER — OFFICE VISIT (OUTPATIENT)
Dept: OBGYN | Age: 50
End: 2025-09-05
Payer: MEDICARE

## 2025-09-05 VITALS
BODY MASS INDEX: 20.02 KG/M2 | HEART RATE: 89 BPM | SYSTOLIC BLOOD PRESSURE: 101 MMHG | WEIGHT: 108.8 LBS | DIASTOLIC BLOOD PRESSURE: 71 MMHG | HEIGHT: 62 IN

## 2025-09-05 DIAGNOSIS — C50.912 INFILTRATING DUCTAL CARCINOMA OF LEFT BREAST (HCC): Primary | ICD-10-CM

## 2025-09-05 PROCEDURE — 3017F COLORECTAL CA SCREEN DOC REV: CPT

## 2025-09-05 PROCEDURE — 4004F PT TOBACCO SCREEN RCVD TLK: CPT

## 2025-09-05 PROCEDURE — G8427 DOCREV CUR MEDS BY ELIG CLIN: HCPCS

## 2025-09-05 PROCEDURE — 99213 OFFICE O/P EST LOW 20 MIN: CPT

## 2025-09-05 PROCEDURE — 36415 COLL VENOUS BLD VENIPUNCTURE: CPT

## 2025-09-05 PROCEDURE — G8420 CALC BMI NORM PARAMETERS: HCPCS

## 2025-09-05 ASSESSMENT — ENCOUNTER SYMPTOMS
CONSTIPATION: 0
RESPIRATORY NEGATIVE: 1
NAUSEA: 0
VOMITING: 0
ABDOMINAL PAIN: 1
CHEST TIGHTNESS: 0
SHORTNESS OF BREATH: 0

## 2025-09-06 LAB
ALBUMIN SERPL-MCNC: 4.7 G/DL (ref 3.4–5)
ALBUMIN/GLOB SERPL: 1.3 {RATIO} (ref 1.1–2.2)
ALP SERPL-CCNC: 116 U/L (ref 40–129)
ALT SERPL-CCNC: 17 U/L (ref 10–40)
ANION GAP SERPL CALCULATED.3IONS-SCNC: 18 MMOL/L (ref 3–16)
AST SERPL-CCNC: 29 U/L (ref 15–37)
BILIRUB SERPL-MCNC: 0.4 MG/DL (ref 0–1)
BUN SERPL-MCNC: 9 MG/DL (ref 7–20)
CALCIUM SERPL-MCNC: 10.9 MG/DL (ref 8.3–10.6)
CHLORIDE SERPL-SCNC: 102 MMOL/L (ref 99–110)
CO2 SERPL-SCNC: 21 MMOL/L (ref 21–32)
CREAT SERPL-MCNC: 0.5 MG/DL (ref 0.6–1.1)
DEPRECATED RDW RBC AUTO: 14.9 % (ref 12.4–15.4)
GFR SERPLBLD CREATININE-BSD FMLA CKD-EPI: >90 ML/MIN/{1.73_M2}
GLUCOSE SERPL-MCNC: 93 MG/DL (ref 70–99)
HCT VFR BLD AUTO: 48.7 % (ref 36–48)
HGB BLD-MCNC: 16.4 G/DL (ref 12–16)
MCH RBC QN AUTO: 31.2 PG (ref 26–34)
MCHC RBC AUTO-ENTMCNC: 33.6 G/DL (ref 31–36)
MCV RBC AUTO: 92.9 FL (ref 80–100)
PLATELET # BLD AUTO: 251 K/UL (ref 135–450)
PMV BLD AUTO: 11.5 FL (ref 5–10.5)
POTASSIUM SERPL-SCNC: 4.7 MMOL/L (ref 3.5–5.1)
PROT SERPL-MCNC: 8.2 G/DL (ref 6.4–8.2)
RBC # BLD AUTO: 5.24 M/UL (ref 4–5.2)
SODIUM SERPL-SCNC: 141 MMOL/L (ref 136–145)
WBC # BLD AUTO: 9.4 K/UL (ref 4–11)